# Patient Record
Sex: FEMALE | Race: WHITE | NOT HISPANIC OR LATINO | Employment: FULL TIME | URBAN - METROPOLITAN AREA
[De-identification: names, ages, dates, MRNs, and addresses within clinical notes are randomized per-mention and may not be internally consistent; named-entity substitution may affect disease eponyms.]

---
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
## 2020-02-06 ENCOUNTER — TELEPHONE (OUTPATIENT)
Dept: FAMILY MEDICINE CLINIC | Facility: CLINIC | Age: 20
End: 2020-02-06

## 2020-02-06 NOTE — TELEPHONE ENCOUNTER
----- Message from Anup Faye sent at 2/6/2020  9:13 AM EST -----  Regarding: schedule patient  Please call pt and schedule PE

## 2020-02-17 RX ORDER — IBUPROFEN 600 MG/1
1 TABLET ORAL 3 TIMES DAILY
COMMUNITY
Start: 2018-04-07

## 2020-02-20 ENCOUNTER — OFFICE VISIT (OUTPATIENT)
Dept: FAMILY MEDICINE CLINIC | Facility: CLINIC | Age: 20
End: 2020-02-20
Payer: COMMERCIAL

## 2020-02-20 VITALS
WEIGHT: 140.8 LBS | DIASTOLIC BLOOD PRESSURE: 80 MMHG | SYSTOLIC BLOOD PRESSURE: 118 MMHG | HEIGHT: 67 IN | RESPIRATION RATE: 16 BRPM | BODY MASS INDEX: 22.1 KG/M2 | OXYGEN SATURATION: 99 % | TEMPERATURE: 97.9 F | HEART RATE: 92 BPM

## 2020-02-20 DIAGNOSIS — Z00.121 ENCOUNTER FOR ROUTINE CHILD HEALTH EXAMINATION WITH ABNORMAL FINDINGS: Primary | ICD-10-CM

## 2020-02-20 DIAGNOSIS — F32.89 OTHER DEPRESSION: ICD-10-CM

## 2020-02-20 PROBLEM — F32.A DEPRESSION: Status: ACTIVE | Noted: 2020-02-20

## 2020-02-20 PROCEDURE — 3008F BODY MASS INDEX DOCD: CPT | Performed by: FAMILY MEDICINE

## 2020-02-20 PROCEDURE — 99395 PREV VISIT EST AGE 18-39: CPT | Performed by: FAMILY MEDICINE

## 2020-02-20 RX ORDER — FLUOXETINE 20 MG/1
20 TABLET, FILM COATED ORAL DAILY
Qty: 30 TABLET | Refills: 1 | Status: SHIPPED | OUTPATIENT
Start: 2020-02-20 | End: 2020-04-16

## 2020-02-20 NOTE — PROGRESS NOTES
Assessment/Plan:    Depression  Patient encouraged to go to therapy  Start fluoxetine (Prozac) 20 mg daily in the morning  Call or come back if any suicidal or homicidal thoughts  She stated that she has good support system between her parents and her friends  Encounter for routine child health examination with abnormal findings  It was discussed about immunizations, diet, exercise and safety measures  Diagnoses and all orders for this visit:    Encounter for routine child health examination with abnormal findings    Other depression  -     FLUoxetine (PROzac) 20 MG tablet; Take 1 tablet (20 mg total) by mouth daily    Other orders  -     ibuprofen (MOTRIN) 600 mg tablet; 1 tablet 3 (three) times a day            Subjective:      Patient ID: Carloyn Steiner is a 21 y o  female  Patient with no PMH presenting for physical  Her main concern today is feelings of being down, depressed, and hopeless  She also has lost interest in enjoyable/fun activities  These symptoms are affecting her sleep and causing her not to want to get up in the morning  She admits to feelings of guilt, lack-of-energy, trouble concentrating, and agitation  She denies changes in appetite or suicidal thoughts  Her only other concern at previous visits was a rash which has now resolved  The following portions of the patient's history were reviewed and updated as appropriate: allergies, current medications, past family history, past medical history, past social history, past surgical history and problem list     Review of Systems   Constitutional: Negative for chills and fever  HENT: Negative for hearing loss  Eyes: Negative for visual disturbance  Respiratory: Negative for cough and shortness of breath  Cardiovascular: Negative for chest pain and palpitations  Gastrointestinal: Negative for diarrhea and vomiting  Endocrine: Negative for cold intolerance and heat intolerance  Genitourinary: Negative for hematuria  Musculoskeletal: Negative for arthralgias and myalgias  Skin: Negative for color change and rash  Neurological: Negative for dizziness and headaches  Hematological: Does not bruise/bleed easily  Psychiatric/Behavioral: Positive for agitation, decreased concentration and dysphoric mood  Negative for behavioral problems, confusion, hallucinations, self-injury, sleep disturbance and suicidal ideas  The patient is nervous/anxious  The patient is not hyperactive  Objective:    /80 (BP Location: Left arm, Patient Position: Sitting, Cuff Size: Standard)   Pulse 92   Temp 97 9 °F (36 6 °C) (Tympanic)   Resp 16   Ht 5' 7" (1 702 m)   Wt 63 9 kg (140 lb 12 8 oz)   SpO2 99%   BMI 22 05 kg/m²      Physical Exam   Constitutional: She is oriented to person, place, and time  She appears well-developed and well-nourished  HENT:   Head: Normocephalic and atraumatic  Right Ear: External ear normal    Left Ear: External ear normal    Eyes: Pupils are equal, round, and reactive to light  EOM are normal    Neck: Normal range of motion  Neck supple  Cardiovascular: Normal rate, regular rhythm and normal heart sounds  No edema  Pulmonary/Chest: Effort normal and breath sounds normal    Abdominal: Soft  Bowel sounds are normal  She exhibits no distension  There is no tenderness  Musculoskeletal: Normal range of motion  She exhibits no edema  Lymphadenopathy:     She has no cervical adenopathy  Neurological: She is alert and oriented to person, place, and time  No cranial nerve deficit  Skin: Skin is warm and dry  No rash noted  No erythema  No pallor  Psychiatric: She has a normal mood and affect  Judgment and thought content normal    Patient seemed nervous  Nursing note and vitals reviewed  Depression Screening Follow-up Plan: Patient's depression screening was positive with a PHQ-2 score of 6  Their PHQ-9 score was 18  Patient assessed for underlying major depression   They have no active suicidal ideations  Brief counseling provided and recommend additional follow-up/re-evaluation next office visit

## 2020-02-20 NOTE — ASSESSMENT & PLAN NOTE
Patient encouraged to go to therapy  Start fluoxetine (Prozac) 20 mg daily in the morning  Call or come back if any suicidal or homicidal thoughts  She stated that she has good support system between her parents and her friends

## 2020-04-16 DIAGNOSIS — F32.89 OTHER DEPRESSION: ICD-10-CM

## 2020-04-16 RX ORDER — FLUOXETINE 20 MG/1
TABLET, FILM COATED ORAL
Qty: 30 TABLET | Refills: 1 | Status: SHIPPED | OUTPATIENT
Start: 2020-04-16 | End: 2020-07-06

## 2020-07-06 DIAGNOSIS — F32.89 OTHER DEPRESSION: ICD-10-CM

## 2020-07-06 RX ORDER — FLUOXETINE 20 MG/1
TABLET, FILM COATED ORAL
Qty: 30 TABLET | Refills: 1 | Status: SHIPPED | OUTPATIENT
Start: 2020-07-06 | End: 2020-09-10

## 2020-09-10 DIAGNOSIS — F32.89 OTHER DEPRESSION: ICD-10-CM

## 2020-09-10 RX ORDER — FLUOXETINE 20 MG/1
TABLET, FILM COATED ORAL
Qty: 30 TABLET | Refills: 1 | Status: SHIPPED | OUTPATIENT
Start: 2020-09-10 | End: 2020-10-16 | Stop reason: SDUPTHER

## 2020-10-16 ENCOUNTER — OFFICE VISIT (OUTPATIENT)
Dept: FAMILY MEDICINE CLINIC | Facility: CLINIC | Age: 20
End: 2020-10-16
Payer: COMMERCIAL

## 2020-10-16 VITALS
HEIGHT: 67 IN | WEIGHT: 174 LBS | OXYGEN SATURATION: 99 % | HEART RATE: 97 BPM | RESPIRATION RATE: 16 BRPM | SYSTOLIC BLOOD PRESSURE: 120 MMHG | DIASTOLIC BLOOD PRESSURE: 74 MMHG | BODY MASS INDEX: 27.31 KG/M2 | TEMPERATURE: 98.6 F

## 2020-10-16 DIAGNOSIS — F32.89 OTHER DEPRESSION: Primary | ICD-10-CM

## 2020-10-16 DIAGNOSIS — B07.8 OTHER VIRAL WARTS: ICD-10-CM

## 2020-10-16 DIAGNOSIS — E78.49 OTHER HYPERLIPIDEMIA: ICD-10-CM

## 2020-10-16 PROCEDURE — 99214 OFFICE O/P EST MOD 30 MIN: CPT | Performed by: FAMILY MEDICINE

## 2020-10-16 PROCEDURE — 1036F TOBACCO NON-USER: CPT | Performed by: FAMILY MEDICINE

## 2020-10-16 PROCEDURE — 17110 DESTRUCTION B9 LES UP TO 14: CPT | Performed by: FAMILY MEDICINE

## 2020-10-16 RX ORDER — FLUOXETINE 20 MG/1
20 TABLET, FILM COATED ORAL DAILY
Qty: 90 TABLET | Refills: 1 | Status: SHIPPED | OUTPATIENT
Start: 2020-10-16 | End: 2021-07-14

## 2020-10-26 ENCOUNTER — TELEPHONE (OUTPATIENT)
Dept: FAMILY MEDICINE CLINIC | Facility: CLINIC | Age: 20
End: 2020-10-26

## 2020-10-31 ENCOUNTER — LAB (OUTPATIENT)
Dept: LAB | Age: 20
End: 2020-10-31
Payer: COMMERCIAL

## 2020-10-31 DIAGNOSIS — E78.49 OTHER HYPERLIPIDEMIA: ICD-10-CM

## 2020-10-31 LAB
ALBUMIN SERPL BCP-MCNC: 3.9 G/DL (ref 3.5–5)
ALP SERPL-CCNC: 61 U/L (ref 46–116)
ALT SERPL W P-5'-P-CCNC: 20 U/L (ref 12–78)
ANION GAP SERPL CALCULATED.3IONS-SCNC: 1 MMOL/L (ref 4–13)
AST SERPL W P-5'-P-CCNC: 12 U/L (ref 5–45)
BILIRUB SERPL-MCNC: 0.62 MG/DL (ref 0.2–1)
BUN SERPL-MCNC: 9 MG/DL (ref 5–25)
CALCIUM SERPL-MCNC: 8.8 MG/DL (ref 8.3–10.1)
CHLORIDE SERPL-SCNC: 106 MMOL/L (ref 100–108)
CHOLEST SERPL-MCNC: 172 MG/DL (ref 50–200)
CO2 SERPL-SCNC: 30 MMOL/L (ref 21–32)
CREAT SERPL-MCNC: 0.67 MG/DL (ref 0.6–1.3)
GFR SERPL CREATININE-BSD FRML MDRD: 127 ML/MIN/1.73SQ M
GLUCOSE P FAST SERPL-MCNC: 85 MG/DL (ref 65–99)
HDLC SERPL-MCNC: 66 MG/DL
LDLC SERPL CALC-MCNC: 96 MG/DL (ref 0–100)
POTASSIUM SERPL-SCNC: 4.4 MMOL/L (ref 3.5–5.3)
PROT SERPL-MCNC: 7.5 G/DL (ref 6.4–8.2)
SODIUM SERPL-SCNC: 137 MMOL/L (ref 136–145)
TRIGL SERPL-MCNC: 48 MG/DL

## 2020-10-31 PROCEDURE — 36415 COLL VENOUS BLD VENIPUNCTURE: CPT

## 2020-10-31 PROCEDURE — 80061 LIPID PANEL: CPT

## 2020-10-31 PROCEDURE — 80053 COMPREHEN METABOLIC PANEL: CPT

## 2020-11-06 ENCOUNTER — TELEPHONE (OUTPATIENT)
Dept: FAMILY MEDICINE CLINIC | Facility: CLINIC | Age: 20
End: 2020-11-06

## 2020-12-31 DIAGNOSIS — L30.9 DERMATITIS: ICD-10-CM

## 2020-12-31 RX ORDER — CLOTRIMAZOLE AND BETAMETHASONE DIPROPIONATE 10; .64 MG/G; MG/G
CREAM TOPICAL
Qty: 30 G | Refills: 0 | Status: SHIPPED | OUTPATIENT
Start: 2020-12-31

## 2021-07-14 DIAGNOSIS — F32.89 OTHER DEPRESSION: ICD-10-CM

## 2021-07-14 RX ORDER — FLUOXETINE 20 MG/1
TABLET, FILM COATED ORAL
Qty: 30 TABLET | Refills: 0 | Status: SHIPPED | OUTPATIENT
Start: 2021-07-14 | End: 2021-08-08

## 2021-08-08 DIAGNOSIS — F32.89 OTHER DEPRESSION: ICD-10-CM

## 2021-08-08 RX ORDER — FLUOXETINE 20 MG/1
TABLET, FILM COATED ORAL
Qty: 30 TABLET | Refills: 0 | Status: SHIPPED | OUTPATIENT
Start: 2021-08-08 | End: 2021-09-03

## 2021-08-16 ENCOUNTER — OFFICE VISIT (OUTPATIENT)
Dept: URGENT CARE | Age: 21
End: 2021-08-16
Payer: COMMERCIAL

## 2021-08-16 VITALS
RESPIRATION RATE: 17 BRPM | HEIGHT: 66 IN | HEART RATE: 88 BPM | WEIGHT: 164 LBS | BODY MASS INDEX: 26.36 KG/M2 | SYSTOLIC BLOOD PRESSURE: 141 MMHG | OXYGEN SATURATION: 98 % | TEMPERATURE: 97.2 F | DIASTOLIC BLOOD PRESSURE: 74 MMHG

## 2021-08-16 DIAGNOSIS — S39.012A STRAIN OF MUSCLE, FASCIA AND TENDON OF LOWER BACK, INITIAL ENCOUNTER: ICD-10-CM

## 2021-08-16 DIAGNOSIS — M54.50 ACUTE MIDLINE LOW BACK PAIN WITHOUT SCIATICA: Primary | ICD-10-CM

## 2021-08-16 LAB
SL AMB  POCT GLUCOSE, UA: NEGATIVE
SL AMB LEUKOCYTE ESTERASE,UA: ABNORMAL
SL AMB POCT BILIRUBIN,UA: NEGATIVE
SL AMB POCT BLOOD,UA: NEGATIVE
SL AMB POCT CLARITY,UA: CLEAR
SL AMB POCT COLOR,UA: YELLOW
SL AMB POCT KETONES,UA: NEGATIVE
SL AMB POCT NITRITE,UA: NEGATIVE
SL AMB POCT PH,UA: 6.5
SL AMB POCT SPECIFIC GRAVITY,UA: 1.01
SL AMB POCT URINE PROTEIN: NEGATIVE
SL AMB POCT UROBILINOGEN: 0.2

## 2021-08-16 PROCEDURE — G0382 LEV 3 HOSP TYPE B ED VISIT: HCPCS | Performed by: NURSE PRACTITIONER

## 2021-08-16 PROCEDURE — 99283 EMERGENCY DEPT VISIT LOW MDM: CPT | Performed by: NURSE PRACTITIONER

## 2021-08-16 PROCEDURE — 81002 URINALYSIS NONAUTO W/O SCOPE: CPT | Performed by: NURSE PRACTITIONER

## 2021-08-16 RX ORDER — METHOCARBAMOL 500 MG/1
500 TABLET, FILM COATED ORAL 2 TIMES DAILY PRN
Qty: 20 TABLET | Refills: 0 | Status: SHIPPED | OUTPATIENT
Start: 2021-08-16

## 2021-08-16 NOTE — PATIENT INSTRUCTIONS
Follow up with pcp   Take meds as directed  Take muscle relaxer as RX, aware of side effects and how to take it  Do not drive or work with medications in your system  Take tylenol motrin or aleve to help relieve symptoms  Use ice and heat 15min each 3 times a day  Stretch  Symptoms worsen go to the ER  Lower Back Exercises   WHAT YOU NEED TO KNOW:   Lower back exercises help heal and strengthen your back muscles to prevent another injury  Ask your healthcare provider if you need to see a physical therapist for more advanced exercises  DISCHARGE INSTRUCTIONS:   Return to the emergency department if:   · You have severe pain that prevents you from moving  Contact your healthcare provider if:   · Your pain becomes worse  · You have new pain  · You have questions or concerns about your condition or care  Do lower back exercises safely:   · Do the exercises on a mat or firm surface  (not on a bed) to support your spine and prevent low back pain  · Move slowly and smoothly  Avoid fast or jerky motions  · Breathe normally  Do not hold your breath  · Stop if you feel pain  It is normal to feel some discomfort at first  Regular exercise will help decrease your discomfort over time  Lower back exercises: Your healthcare provider may recommend that you do back exercises 10 to 30 minutes each day  He may also recommend that you do exercises 1 to 3 times each day  Ask your healthcare provider which exercises are best for you and how often to do them  · Ankle pumps:  Lie on your back  Move your foot up (with your toes pointing toward your head)  Then, move your foot down (with your toes pointing away from you)  Repeat this exercise 10 times on each side  · Heel slides:  Lie on your back  Slowly bend one leg and then straighten it  Next, bend the other leg and then straighten it  Repeat 10 times on each side           · Pelvic tilt:  Lie on your back with your knees bent and feet flat on the floor  Place your arms in a relaxed position beside your body  Tighten the muscles of your abdomen and flatten your back against the floor  Hold for 5 seconds  Repeat 5 times  · Back stretch:  Lie on your back with your hands behind your head  Bend your knees and turn the lower half of your body to one side  Hold this position for 10 seconds  Repeat 3 times on each side  · Straight leg raises:  Lie on your back with one leg straight  Bend the other knee  Tighten your abdomen and then slowly lift the straight leg up about 6 to 12 inches off the floor  Hold for 1 to 5 seconds  Lower your leg slowly  Repeat 10 times on each leg  · Knee-to-chest:  Lie on your back with your knees bent and feet flat on the floor  Pull one of your knees toward your chest and hold it there for 5 seconds  Return your leg to the starting position  Lift the other knee toward your chest and hold for 5 seconds  Do this 5 times on each side  · Cat and camel:  Place your hands and knees on the floor  Arch your back upward toward the ceiling and lower your head  Round out your spine as much as you can  Hold for 5 seconds  Lift your head upward and push your chest downward toward the floor  Hold for 5 seconds  Do 3 sets or as directed  · Wall squats:  Stand with your back against a wall  Tighten the muscles of your abdomen  Slowly lower your body until your knees are bent at a 45 degree angle  Hold this position for 5 seconds  Slowly move back up to a standing position  Repeat 10 times  · Curl up:  Lie on your back with your knees bent and feet flat on the floor  Place your hands, palms down, underneath the curve in your lower back  Next, with your elbows on the floor, lift your shoulders and chest 2 to 3 inches  Keep your head in line with your shoulders  Hold this position for 5 seconds  When you can do this exercise without pain for 10 to 15 seconds, you may add a rotation   While your shoulders and chest are lifted off the ground, turn slightly to the left and hold  Repeat on the other side  · Bird dog:  Place your hands and knees on the floor  Keep your wrists directly below your shoulders and your knees directly below your hips  Pull your belly button in toward your spine  Do not flatten or arch your back  Tighten your abdominal muscles  Raise one arm straight out so that it is aligned with your head  Next, raise the leg opposite your arm  Hold this position for 15 seconds  Lower your arm and leg slowly and change sides  Do 5 sets  © Copyright [employer] 2021 Information is for End User's use only and may not be sold, redistributed or otherwise used for commercial purposes  All illustrations and images included in CareNotes® are the copyrighted property of A D A M [employer] Inc  or Babar Anand  The above information is an  only  It is not intended as medical advice for individual conditions or treatments  Talk to your doctor, nurse or pharmacist before following any medical regimen to see if it is safe and effective for you  Low Back Strain   WHAT YOU NEED TO KNOW:   Low back strain is an injury to your lower back muscles or tendons  Tendons are strong tissues that connect muscles to bones  The lower back supports most of your body weight and helps you move, twist, and bend  DISCHARGE INSTRUCTIONS:   Return to the emergency department if:   · You hear or feel a pop in your lower back  · You have increased swelling or pain in your lower back  · You have trouble moving your legs  · Your legs are numb  Contact your healthcare provider if:   · You have a fever  · Your pain does not go away, even after treatment  · You have questions or concerns about your condition or care  Medicines: The following medicines may be ordered by your healthcare provider:  · Acetaminophen decreases pain and fever  It is available without a doctor's order   Ask how much to take and how often to take it  Follow directions  Acetaminophen can cause liver damage if not taken correctly  · NSAIDs , such as ibuprofen, help decrease swelling, pain, and fever  This medicine is available with or without a doctor's order  NSAIDs can cause stomach bleeding or kidney problems in certain people  If you take blood thinner medicine, always ask your healthcare provider if NSAIDs are safe for you  Always read the medicine label and follow directions  · Muscle relaxers  help decrease pain and muscle spasms  · Prescription pain medicine  may be given  Ask how to take this medicine safely  · Take your medicine as directed  Contact your healthcare provider if you think your medicine is not helping or if you have side effects  Tell him or her if you are allergic to any medicine  Keep a list of the medicines, vitamins, and herbs you take  Include the amounts, and when and why you take them  Bring the list or the pill bottles to follow-up visits  Carry your medicine list with you in case of an emergency  Self-care:   · Rest  as directed  You may need to rest in bed for a period of time after your injury  Do not lift heavy objects  · Apply ice  on your back for 15 to 20 minutes every hour or as directed  Use an ice pack, or put crushed ice in a plastic bag  Cover it with a towel  Ice helps prevent tissue damage and decreases swelling and pain  · Apply heat  on your lower back for 20 to 30 minutes every 2 hours for as many days as directed  Heat helps decrease pain and muscle spasms  · Slowly start to increase your activity  as the pain decreases, or as directed  Prevent another low back strain:   · Use correct body movements  ? Bend at the hips and knees when you  objects  Do not bend from the waist  Use your leg muscles as you lift the load  Do not use your back  Keep the object close to your chest as you lift it   Try not to twist or lift anything above your waist     ? Change your position often when you stand for long periods of time  Rest one foot on a small box or footrest, and then switch to the other foot often  ? Try not to sit for long periods of time  When you do, sit in a straight-backed chair with your feet flat on the floor  ? Never reach, pull, or push while you are sitting  · Warm up before you exercise  Do exercises that strengthen your back muscles  Ask your healthcare provider about the best exercise plan for you  · Maintain a healthy weight  Ask your healthcare provider how much you should weigh  Ask him to help you create a weight loss plan if you are overweight  © Copyright [employer] 2021 Information is for End User's use only and may not be sold, redistributed or otherwise used for commercial purposes  All illustrations and images included in CareNotes® are the copyrighted property of [employer]  or Babar Anand  The above information is an  only  It is not intended as medical advice for individual conditions or treatments  Talk to your doctor, nurse or pharmacist before following any medical regimen to see if it is safe and effective for you

## 2021-08-16 NOTE — PROGRESS NOTES
NAME: Lisa Hay is a 24 y o  female  : 2000    MRN: [id number]    /74   Pulse 88   Temp (!) 97 2 °F (36 2 °C)   Resp 17   Ht 5' 5 5" (1 664 m)   Wt 74 4 kg (164 lb)   LMP  (LMP Unknown)   SpO2 98%   BMI 26 88 kg/m²     Assessment and Plan   Acute midline low back pain without sciatica [M54 5]  1  Acute midline low back pain without sciatica  POCT urine dip    methocarbamol (ROBAXIN) 500 mg tablet   2  Strain of muscle, fascia and tendon of lower back, initial encounter  methocarbamol (ROBAXIN) 500 mg tablet       Deysi was seen today for back pain  Diagnoses and all orders for this visit:    Acute midline low back pain without sciatica  -     POCT urine dip  -     methocarbamol (ROBAXIN) 500 mg tablet; Take 1 tablet (500 mg total) by mouth 2 (two) times a day as needed for muscle spasms    Strain of muscle, fascia and tendon of lower back, initial encounter  -     methocarbamol (ROBAXIN) 500 mg tablet; Take 1 tablet (500 mg total) by mouth 2 (two) times a day as needed for muscle spasms        Patient Instructions   Patient Instructions     Follow up with pcp   Take meds as directed  Take muscle relaxer as RX, aware of side effects and how to take it  Do not drive or work with medications in your system  Take tylenol motrin or aleve to help relieve symptoms  Use ice and heat 15min each 3 times a day  Stretch  Symptoms worsen go to the ER  Lower Back Exercises   WHAT YOU NEED TO KNOW:   Lower back exercises help heal and strengthen your back muscles to prevent another injury  Ask your healthcare provider if you need to see a physical therapist for more advanced exercises  DISCHARGE INSTRUCTIONS:   Return to the emergency department if:   · You have severe pain that prevents you from moving  Contact your healthcare provider if:   · Your pain becomes worse  · You have new pain  · You have questions or concerns about your condition or care      Do lower back exercises safely:   · Do the exercises on a mat or firm surface  (not on a bed) to support your spine and prevent low back pain  · Move slowly and smoothly  Avoid fast or jerky motions  · Breathe normally  Do not hold your breath  · Stop if you feel pain  It is normal to feel some discomfort at first  Regular exercise will help decrease your discomfort over time  Lower back exercises: Your healthcare provider may recommend that you do back exercises 10 to 30 minutes each day  He may also recommend that you do exercises 1 to 3 times each day  Ask your healthcare provider which exercises are best for you and how often to do them  · Ankle pumps:  Lie on your back  Move your foot up (with your toes pointing toward your head)  Then, move your foot down (with your toes pointing away from you)  Repeat this exercise 10 times on each side  · Heel slides:  Lie on your back  Slowly bend one leg and then straighten it  Next, bend the other leg and then straighten it  Repeat 10 times on each side  · Pelvic tilt:  Lie on your back with your knees bent and feet flat on the floor  Place your arms in a relaxed position beside your body  Tighten the muscles of your abdomen and flatten your back against the floor  Hold for 5 seconds  Repeat 5 times  · Back stretch:  Lie on your back with your hands behind your head  Bend your knees and turn the lower half of your body to one side  Hold this position for 10 seconds  Repeat 3 times on each side  · Straight leg raises:  Lie on your back with one leg straight  Bend the other knee  Tighten your abdomen and then slowly lift the straight leg up about 6 to 12 inches off the floor  Hold for 1 to 5 seconds  Lower your leg slowly  Repeat 10 times on each leg  · Knee-to-chest:  Lie on your back with your knees bent and feet flat on the floor  Pull one of your knees toward your chest and hold it there for 5 seconds   Return your leg to the starting position  Lift the other knee toward your chest and hold for 5 seconds  Do this 5 times on each side  · Cat and camel:  Place your hands and knees on the floor  Arch your back upward toward the ceiling and lower your head  Round out your spine as much as you can  Hold for 5 seconds  Lift your head upward and push your chest downward toward the floor  Hold for 5 seconds  Do 3 sets or as directed  · Wall squats:  Stand with your back against a wall  Tighten the muscles of your abdomen  Slowly lower your body until your knees are bent at a 45 degree angle  Hold this position for 5 seconds  Slowly move back up to a standing position  Repeat 10 times  · Curl up:  Lie on your back with your knees bent and feet flat on the floor  Place your hands, palms down, underneath the curve in your lower back  Next, with your elbows on the floor, lift your shoulders and chest 2 to 3 inches  Keep your head in line with your shoulders  Hold this position for 5 seconds  When you can do this exercise without pain for 10 to 15 seconds, you may add a rotation  While your shoulders and chest are lifted off the ground, turn slightly to the left and hold  Repeat on the other side  · Bird dog:  Place your hands and knees on the floor  Keep your wrists directly below your shoulders and your knees directly below your hips  Pull your belly button in toward your spine  Do not flatten or arch your back  Tighten your abdominal muscles  Raise one arm straight out so that it is aligned with your head  Next, raise the leg opposite your arm  Hold this position for 15 seconds  Lower your arm and leg slowly and change sides  Do 5 sets  © Copyright [employer] 2021 Information is for End User's use only and may not be sold, redistributed or otherwise used for commercial purposes   All illustrations and images included in CareNotes® are the copyrighted property of [employer] Inc  or Babar Anand  The above information is an  only  It is not intended as medical advice for individual conditions or treatments  Talk to your doctor, nurse or pharmacist before following any medical regimen to see if it is safe and effective for you  Low Back Strain   WHAT YOU NEED TO KNOW:   Low back strain is an injury to your lower back muscles or tendons  Tendons are strong tissues that connect muscles to bones  The lower back supports most of your body weight and helps you move, twist, and bend  DISCHARGE INSTRUCTIONS:   Return to the emergency department if:   · You hear or feel a pop in your lower back  · You have increased swelling or pain in your lower back  · You have trouble moving your legs  · Your legs are numb  Contact your healthcare provider if:   · You have a fever  · Your pain does not go away, even after treatment  · You have questions or concerns about your condition or care  Medicines: The following medicines may be ordered by your healthcare provider:  · Acetaminophen decreases pain and fever  It is available without a doctor's order  Ask how much to take and how often to take it  Follow directions  Acetaminophen can cause liver damage if not taken correctly  · NSAIDs , such as ibuprofen, help decrease swelling, pain, and fever  This medicine is available with or without a doctor's order  NSAIDs can cause stomach bleeding or kidney problems in certain people  If you take blood thinner medicine, always ask your healthcare provider if NSAIDs are safe for you  Always read the medicine label and follow directions  · Muscle relaxers  help decrease pain and muscle spasms  · Prescription pain medicine  may be given  Ask how to take this medicine safely  · Take your medicine as directed  Contact your healthcare provider if you think your medicine is not helping or if you have side effects  Tell him or her if you are allergic to any medicine   Keep a list of the medicines, vitamins, and herbs you take  Include the amounts, and when and why you take them  Bring the list or the pill bottles to follow-up visits  Carry your medicine list with you in case of an emergency  Self-care:   · Rest  as directed  You may need to rest in bed for a period of time after your injury  Do not lift heavy objects  · Apply ice  on your back for 15 to 20 minutes every hour or as directed  Use an ice pack, or put crushed ice in a plastic bag  Cover it with a towel  Ice helps prevent tissue damage and decreases swelling and pain  · Apply heat  on your lower back for 20 to 30 minutes every 2 hours for as many days as directed  Heat helps decrease pain and muscle spasms  · Slowly start to increase your activity  as the pain decreases, or as directed  Prevent another low back strain:   · Use correct body movements  ? Bend at the hips and knees when you  objects  Do not bend from the waist  Use your leg muscles as you lift the load  Do not use your back  Keep the object close to your chest as you lift it  Try not to twist or lift anything above your waist     ? Change your position often when you stand for long periods of time  Rest one foot on a small box or footrest, and then switch to the other foot often  ? Try not to sit for long periods of time  When you do, sit in a straight-backed chair with your feet flat on the floor  ? Never reach, pull, or push while you are sitting  · Warm up before you exercise  Do exercises that strengthen your back muscles  Ask your healthcare provider about the best exercise plan for you  · Maintain a healthy weight  Ask your healthcare provider how much you should weigh  Ask him to help you create a weight loss plan if you are overweight  © Copyright [employer] 2021 Information is for End User's use only and may not be sold, redistributed or otherwise used for commercial purposes   All illustrations and images included in [facility] are the copyrighted property of [employer] M [employer] Inc  or Babar Carlos [facility]  The above information is an  only  It is not intended as medical advice for individual conditions or treatments  Talk to your doctor, nurse or pharmacist before following any medical regimen to see if it is safe and effective for you  Proceed to the nearest ER if symptoms worsen, Follow up with your PCP  Continue to social distance, wash your hands, and wear your masks  Please continue to follow the CDC  gov guidelines daily for they are subject to change on COVID-19    Chief Complaint     Chief Complaint   Patient presents with    Back Pain     started Thurs mid low back like pins/needles  Worse w/ ambulation, nonradiating  Heavier lifting Weds         History of Present Illness     23 yo female here today with low back pain for the past 4 days, she did an office move at that time but doesn't recall injuring herself  She has been taking tylenol and icy hot to the area with little relief, no issues having a BM or with urinating  She has no pain or burning with urination and no chance or pregnancy  Review of Systems   Review of Systems   Constitutional: Negative  HENT: Negative  Respiratory: Negative  Cardiovascular: Negative  Gastrointestinal: Negative  Musculoskeletal: Positive for back pain (low back pain)  Negative for gait problem  Skin: Negative  Neurological: Negative            Current Medications       Current Outpatient Medications:     FLUoxetine (PROzac) 20 MG tablet, TAKE 1 TABLET BY MOUTH EVERY DAY, Disp: 30 tablet, Rfl: 0    ibuprofen (MOTRIN) 600 mg tablet, 1 tablet 3 (three) times a day, Disp: , Rfl:     clotrimazole-betamethasone (LOTRISONE) 1-0 05 % cream, APPLY TO AFFECTED AREA TWICE A DAY (Patient not taking: Reported on 8/16/2021), Disp: 30 g, Rfl: 0    methocarbamol (ROBAXIN) 500 mg tablet, Take 1 tablet (500 mg total) by mouth 2 (two) times a day as needed for muscle spasms, Disp: 20 tablet, Rfl: 0    norethindrone (MICRONOR) 0 35 MG tablet, Take 0 35 mg by mouth daily, Disp: , Rfl:     Current Allergies     Allergies as of 08/16/2021 - Reviewed 08/16/2021   Allergen Reaction Noted    Sulfisoxazole  05/22/2018              Past Medical History:   Diagnosis Date    History of placement of ear tubes        Past Surgical History:   Procedure Laterality Date    TYMPANOSTOMY TUBE PLACEMENT         Family History   Problem Relation Age of Onset    Hypertension Maternal Grandmother     COPD Maternal Grandmother     Breast cancer Maternal Grandfather          Medications have been verified  The following portions of the patient's history were reviewed and updated as appropriate: allergies, current medications, past family history, past medical history, past social history, past surgical history and problem list     Objective   /74   Pulse 88   Temp (!) 97 2 °F (36 2 °C)   Resp 17   Ht 5' 5 5" (1 664 m)   Wt 74 4 kg (164 lb)   LMP  (LMP Unknown)   SpO2 98%   BMI 26 88 kg/m²      Physical Exam     Physical Exam  Constitutional:       Appearance: Normal appearance  Cardiovascular:      Rate and Rhythm: Normal rate and regular rhythm  Pulses: Normal pulses  Heart sounds: Normal heart sounds  Pulmonary:      Effort: Pulmonary effort is normal       Breath sounds: Normal breath sounds  Musculoskeletal:         General: Tenderness present  No swelling, deformity or signs of injury  Cervical back: Normal       Thoracic back: Normal       Lumbar back: Spasms and tenderness present  No swelling, edema, deformity, signs of trauma or lacerations  Decreased range of motion  Negative right straight leg raise test  No scoliosis  Right lower leg: No edema  Left lower leg: No edema  Comments: Pain with flexing forward, offers no complaints, no bruising, no abrasions, limited ROM   Skin:     General: Skin is warm        Capillary Refill: Capillary refill takes less than 2 seconds  Neurological:      Mental Status: She is alert  Note: Portions of this record may have been created with voice recognition software  Occasional wrong word or "sound a like" substitutions may have occurred due to the inherent limitations of voice recognition software  Please read the chart carefully and recognize, using context, where substitutions have occurred  JAVIER Sharma

## 2021-09-01 DIAGNOSIS — F32.89 OTHER DEPRESSION: ICD-10-CM

## 2021-09-01 NOTE — TELEPHONE ENCOUNTER
[facility] sent request to refill prozac  I sent message on mychart to pt stating she is due for appt for refill     Pt was last seen 10/16/20

## 2021-09-03 RX ORDER — FLUOXETINE 20 MG/1
TABLET, FILM COATED ORAL
Qty: 7 TABLET | Refills: 0 | Status: SHIPPED | OUTPATIENT
Start: 2021-09-03 | End: 2021-10-01 | Stop reason: SDUPTHER

## 2021-10-01 DIAGNOSIS — F32.89 OTHER DEPRESSION: ICD-10-CM

## 2021-10-01 RX ORDER — FLUOXETINE 20 MG/1
20 TABLET, FILM COATED ORAL DAILY
Qty: 30 TABLET | Refills: 0 | Status: SHIPPED | OUTPATIENT
Start: 2021-10-01 | End: 2021-10-07 | Stop reason: SDUPTHER

## 2021-10-07 ENCOUNTER — OFFICE VISIT (OUTPATIENT)
Dept: FAMILY MEDICINE CLINIC | Facility: CLINIC | Age: 21
End: 2021-10-07
Payer: COMMERCIAL

## 2021-10-07 VITALS
BODY MASS INDEX: 33.35 KG/M2 | HEART RATE: 84 BPM | SYSTOLIC BLOOD PRESSURE: 122 MMHG | RESPIRATION RATE: 16 BRPM | HEIGHT: 66 IN | TEMPERATURE: 97.7 F | WEIGHT: 207.5 LBS | OXYGEN SATURATION: 99 % | DIASTOLIC BLOOD PRESSURE: 78 MMHG

## 2021-10-07 DIAGNOSIS — F32.89 OTHER DEPRESSION: ICD-10-CM

## 2021-10-07 DIAGNOSIS — Z00.00 HEALTHCARE MAINTENANCE: Primary | ICD-10-CM

## 2021-10-07 DIAGNOSIS — F84.5 ASPERGER'S DISORDER: ICD-10-CM

## 2021-10-07 PROCEDURE — 1036F TOBACCO NON-USER: CPT | Performed by: FAMILY MEDICINE

## 2021-10-07 PROCEDURE — 99395 PREV VISIT EST AGE 18-39: CPT | Performed by: FAMILY MEDICINE

## 2021-10-07 PROCEDURE — 3008F BODY MASS INDEX DOCD: CPT | Performed by: FAMILY MEDICINE

## 2021-10-07 RX ORDER — FLUOXETINE 20 MG/1
20 TABLET, FILM COATED ORAL DAILY
Qty: 30 TABLET | Refills: 3 | Status: SHIPPED | OUTPATIENT
Start: 2021-10-07 | End: 2021-12-07 | Stop reason: SDUPTHER

## 2021-10-14 ENCOUNTER — OFFICE VISIT (OUTPATIENT)
Dept: URGENT CARE | Age: 21
End: 2021-10-14

## 2021-10-14 VITALS
BODY MASS INDEX: 32.14 KG/M2 | HEART RATE: 80 BPM | TEMPERATURE: 98 F | WEIGHT: 200 LBS | OXYGEN SATURATION: 100 % | HEIGHT: 66 IN | DIASTOLIC BLOOD PRESSURE: 60 MMHG | SYSTOLIC BLOOD PRESSURE: 130 MMHG | RESPIRATION RATE: 18 BRPM

## 2021-10-14 DIAGNOSIS — R39.9 UTI SYMPTOMS: ICD-10-CM

## 2021-10-14 DIAGNOSIS — R30.0 DYSURIA: Primary | ICD-10-CM

## 2021-10-14 LAB
SL AMB  POCT GLUCOSE, UA: ABNORMAL
SL AMB LEUKOCYTE ESTERASE,UA: ABNORMAL
SL AMB POCT BILIRUBIN,UA: ABNORMAL
SL AMB POCT BLOOD,UA: ABNORMAL
SL AMB POCT CLARITY,UA: ABNORMAL
SL AMB POCT COLOR,UA: YELLOW
SL AMB POCT KETONES,UA: ABNORMAL
SL AMB POCT NITRITE,UA: ABNORMAL
SL AMB POCT PH,UA: 8
SL AMB POCT SPECIFIC GRAVITY,UA: 1.01
SL AMB POCT URINE PROTEIN: ABNORMAL
SL AMB POCT UROBILINOGEN: 0.2

## 2021-10-14 PROCEDURE — 87147 CULTURE TYPE IMMUNOLOGIC: CPT | Performed by: NURSE PRACTITIONER

## 2021-10-14 PROCEDURE — 81002 URINALYSIS NONAUTO W/O SCOPE: CPT | Performed by: NURSE PRACTITIONER

## 2021-10-14 PROCEDURE — G0382 LEV 3 HOSP TYPE B ED VISIT: HCPCS | Performed by: NURSE PRACTITIONER

## 2021-10-14 PROCEDURE — 87086 URINE CULTURE/COLONY COUNT: CPT | Performed by: NURSE PRACTITIONER

## 2021-10-14 RX ORDER — PHENAZOPYRIDINE HYDROCHLORIDE 200 MG/1
200 TABLET, FILM COATED ORAL
Qty: 12 TABLET | Refills: 0 | Status: SHIPPED | OUTPATIENT
Start: 2021-10-14

## 2021-10-14 RX ORDER — NITROFURANTOIN 25; 75 MG/1; MG/1
100 CAPSULE ORAL 2 TIMES DAILY
Qty: 14 CAPSULE | Refills: 0 | Status: SHIPPED | OUTPATIENT
Start: 2021-10-14

## 2021-10-15 LAB — BACTERIA UR CULT: ABNORMAL

## 2021-10-18 ENCOUNTER — TELEPHONE (OUTPATIENT)
Dept: URGENT CARE | Age: 21
End: 2021-10-18

## 2021-12-07 ENCOUNTER — TELEPHONE (OUTPATIENT)
Dept: FAMILY MEDICINE CLINIC | Facility: CLINIC | Age: 21
End: 2021-12-07

## 2021-12-07 DIAGNOSIS — F32.89 OTHER DEPRESSION: ICD-10-CM

## 2021-12-07 RX ORDER — FLUOXETINE 20 MG/1
20 TABLET, FILM COATED ORAL DAILY
Qty: 90 TABLET | Refills: 0 | Status: SHIPPED | OUTPATIENT
Start: 2021-12-07 | End: 2022-05-02

## 2022-04-19 ENCOUNTER — OFFICE VISIT (OUTPATIENT)
Dept: URGENT CARE | Age: 22
End: 2022-04-19
Payer: COMMERCIAL

## 2022-04-19 VITALS
HEIGHT: 66 IN | OXYGEN SATURATION: 99 % | TEMPERATURE: 97.8 F | RESPIRATION RATE: 18 BRPM | WEIGHT: 205 LBS | HEART RATE: 104 BPM | BODY MASS INDEX: 32.95 KG/M2

## 2022-04-19 DIAGNOSIS — J06.9 ACUTE URI: Primary | ICD-10-CM

## 2022-04-19 DIAGNOSIS — R05.9 COUGH: ICD-10-CM

## 2022-04-19 PROCEDURE — 87636 SARSCOV2 & INF A&B AMP PRB: CPT

## 2022-04-19 PROCEDURE — G0382 LEV 3 HOSP TYPE B ED VISIT: HCPCS

## 2022-04-19 RX ORDER — BENZONATATE 200 MG/1
200 CAPSULE ORAL 3 TIMES DAILY PRN
Qty: 20 CAPSULE | Refills: 0 | Status: SHIPPED | OUTPATIENT
Start: 2022-04-19 | End: 2022-05-31 | Stop reason: ALTCHOICE

## 2022-04-19 NOTE — PROGRESS NOTES
[facility]        NAME: Elfrieda Baumgarten is a 25 y o  female  : 2000    MRN: [id number]  DATE: 2022  TIME: 11:27 AM    Assessment and Plan   Acute URI [J06 9]  1  Acute URI     2  Cough  Covid/Flu-Office Collect    benzonatate (TESSALON) 200 MG capsule     Patient has complaints of URI symptoms including cough, sore throat, muscle aches, congestion, and post nasal drip  Works in a nursing home  Has not tried any OTC medication  At this time will order Tessalon for cough and send for COVID/Flu    Patient Instructions     Take Tessalon as directed  May use OTC medication for congestion  Follow up with PCP as needed    Chief Complaint     Chief Complaint   Patient presents with    Cold Like Symptoms     Pt c/o dry cough,muscle ache, fatigue, H/A, sore throat & runny nose started Sat  Also chest sore from cough Tried Claritin/Flonase +Vacc         History of Present Illness       Patient states has been having symptoms of cough, congestion, HA, sore throat, muscle aches and fatigue since Friday night  Has been taking Claritin with minimal relief  Has not tried anything OTC  She thought it was allergies  Denies fevers  COVID and flu vaccinated  Has not had any sick contacts  Cough  This is a new problem  The current episode started in the past 7 days  The problem has been waxing and waning  The problem occurs hourly  The cough is non-productive  Associated symptoms include headaches, myalgias, nasal congestion, postnasal drip, rhinorrhea and a sore throat  Pertinent negatives include no chest pain, ear congestion, ear pain, fever or shortness of breath  The symptoms are aggravated by pollens and exercise  She has tried nothing for the symptoms  Her past medical history is significant for environmental allergies  There is no history of asthma, bronchitis or pneumonia  Review of Systems   Review of Systems   Constitutional: Negative for fever     HENT: Positive for postnasal drip, rhinorrhea and sore throat  Negative for ear pain  Respiratory: Positive for cough  Negative for shortness of breath  Cardiovascular: Negative for chest pain and palpitations  Musculoskeletal: Positive for myalgias  Allergic/Immunologic: Positive for environmental allergies  Neurological: Positive for headaches           Current Medications       Current Outpatient Medications:     FLUoxetine (PROzac) 20 MG tablet, Take 1 tablet (20 mg total) by mouth daily, Disp: 90 tablet, Rfl: 0    ibuprofen (MOTRIN) 600 mg tablet, 1 tablet 3 (three) times a day, Disp: , Rfl:     benzonatate (TESSALON) 200 MG capsule, Take 1 capsule (200 mg total) by mouth 3 (three) times a day as needed for cough, Disp: 20 capsule, Rfl: 0    clotrimazole-betamethasone (LOTRISONE) 1-0 05 % cream, APPLY TO AFFECTED AREA TWICE A DAY (Patient not taking: Reported on 8/16/2021), Disp: 30 g, Rfl: 0    methocarbamol (ROBAXIN) 500 mg tablet, Take 1 tablet (500 mg total) by mouth 2 (two) times a day as needed for muscle spasms (Patient not taking: Reported on 10/14/2021), Disp: 20 tablet, Rfl: 0    nitrofurantoin (MACROBID) 100 mg capsule, Take 1 capsule (100 mg total) by mouth 2 (two) times a day (Patient not taking: Reported on 4/19/2022 ), Disp: 14 capsule, Rfl: 0    norethindrone (MICRONOR) 0 35 MG tablet, Take 0 35 mg by mouth daily, Disp: , Rfl:     phenazopyridine (PYRIDIUM) 200 mg tablet, Take 1 tablet (200 mg total) by mouth 3 (three) times a day with meals (Patient not taking: Reported on 4/19/2022 ), Disp: 12 tablet, Rfl: 0    Current Allergies     Allergies as of 04/19/2022 - Reviewed 04/19/2022   Allergen Reaction Noted    Sulfisoxazole  05/22/2018            The following portions of the patient's history were reviewed and updated as appropriate: allergies, current medications, past family history, past medical history, past social history, past surgical history and problem list      Past Medical History:   Diagnosis Date    History of placement of ear tubes        Past Surgical History:   Procedure Laterality Date    TYMPANOSTOMY TUBE PLACEMENT         Family History   Problem Relation Age of Onset    Hypertension Maternal Grandmother     COPD Maternal Grandmother     Breast cancer Maternal Grandfather          Medications have been verified  Objective   Pulse 104   Temp 97 8 °F (36 6 °C)   Resp 18   Ht 5' 6" (1 676 m)   Wt 93 kg (205 lb)   LMP  (LMP Unknown)   SpO2 99%   BMI 33 09 kg/m²   No LMP recorded (lmp unknown)  (Menstrual status: Birth Control)  Physical Exam     Physical Exam  Vitals reviewed  Constitutional:       General: She is not in acute distress  Appearance: Normal appearance  She is normal weight  She is not ill-appearing  HENT:      Head: Normocephalic and atraumatic  Right Ear: Tympanic membrane and ear canal normal  No middle ear effusion  Left Ear: Tympanic membrane and ear canal normal   No middle ear effusion  Nose: Congestion and rhinorrhea present  Mouth/Throat:      Mouth: Mucous membranes are moist       Pharynx: Posterior oropharyngeal erythema present  No oropharyngeal exudate  Tonsils: No tonsillar exudate  Eyes:      Extraocular Movements: Extraocular movements intact  Conjunctiva/sclera: Conjunctivae normal       Pupils: Pupils are equal, round, and reactive to light  Cardiovascular:      Rate and Rhythm: Normal rate and regular rhythm  Pulses: Normal pulses  Heart sounds: Normal heart sounds  No murmur heard  Pulmonary:      Effort: Pulmonary effort is normal  No respiratory distress  Breath sounds: Normal breath sounds  No wheezing  Abdominal:      General: Bowel sounds are normal  There is no distension  Palpations: Abdomen is soft  Tenderness: There is no abdominal tenderness  There is no guarding  Musculoskeletal:      Cervical back: Normal range of motion and neck supple  No tenderness  Lymphadenopathy:      Cervical: No cervical adenopathy  Skin:     General: Skin is warm  Neurological:      General: No focal deficit present  Mental Status: She is alert     Psychiatric:         Mood and Affect: Mood normal          Behavior: Behavior normal          Judgment: Judgment normal

## 2022-04-19 NOTE — LETTER
[facility] [address]  [address] [address]  Dept: [phone]    April 19, 2022    Patient: Rayvon Klinefelter Doutt  YOB: 2000    Rayvon Klinefelter Doutt was seen and evaluated at our [facility]  Please note if Covid and Flu tests are negative, they may return to work when fever free for 24 hours without the use of a fever reducing agent  If Covid or Flu test is positive, they may return to work on 4/22/2022 , as this is 5 days from the onset of symptoms  Upon return, they must then adhere to strict masking for an additional 5 days      Sincerely,    JAVIER Miranda

## 2022-04-19 NOTE — PATIENT INSTRUCTIONS
Cold Symptoms   AMBULATORY CARE:   Cold symptoms  include sneezing, dry throat, a stuffy nose, headache, watery eyes, and a cough  Your cough may be dry, or you may cough up mucus  You may also have muscle aches, joint pain, and tiredness  Rarely, you may have a fever  Cold symptoms occur from inflammation in your upper respiratory system caused by a virus  Most colds go away without treatment  Seek care immediately if:   · You have increased tiredness and weakness  · You are unable to eat  · Your heart is beating much faster than usual for you  · You see white spots in the back of your throat and your neck is swollen and sore to the touch  · You see pinpoint or larger reddish-purple dots on your skin  Contact your healthcare provider if:   · You have a fever higher than 102°F (38 9°C)  · You have new or worsening shortness of breath  · You have thick nasal drainage for more than 2 days  · Your symptoms do not improve or get worse within 5 days  · You have questions or concerns about your condition or care  Treatment for cold symptoms  may include NSAIDS to decrease muscle aches and fever  Cold medicines may also be given to decrease coughing, nasal stuffiness, sneezing, and a runny nose  Manage your cold symptoms: The following may help relieve cold symptoms, such as a dry throat and congestion:  · Gargle with mouthwash or warm salt water as directed  · Suck on throat lozenges or hard candy  · Use a cold or warm vaporizer or humidifier to ease your breathing  · Rest for at least 2 days and then as needed to decrease tiredness and weakness  · Use petroleum based jelly around your nostrils to decrease irritation from blowing your nose  · Drink plenty of liquids  Liquids will help thin and loosen thick mucus so you can cough it up  Liquids will also keep you hydrated   Ask your healthcare provider which liquids are best for you and how much to drink each day     Prevent the spread of germs  by washing your hands often  You can spread your cold germs to others for at least 3 days after your symptoms start  Do not share items, such as eating utensils  Cover your nose and mouth when you cough or sneeze using the crook of your elbow instead of your hands  Throw used tissues in the garbage  Do not smoke:  Smoking may worsen your symptoms and increase the length of time you feel sick  Talk with your healthcare provider if you need help to stop smoking  Follow up with your doctor as directed:  Write down your questions so you remember to ask them during your visits  © Copyright [employer] 2022 Information is for End User's use only and may not be sold, redistributed or otherwise used for commercial purposes  All illustrations and images included in CareNotes® are the copyrighted property of [employer]  or Babar Anand  The above information is an  only  It is not intended as medical advice for individual conditions or treatments  Talk to your doctor, nurse or pharmacist before following any medical regimen to see if it is safe and effective for you

## 2022-04-20 LAB
FLUAV RNA RESP QL NAA+PROBE: NEGATIVE
FLUBV RNA RESP QL NAA+PROBE: NEGATIVE
SARS-COV-2 RNA RESP QL NAA+PROBE: NEGATIVE

## 2022-04-29 DIAGNOSIS — F32.89 OTHER DEPRESSION: ICD-10-CM

## 2022-05-02 RX ORDER — FLUOXETINE 20 MG/1
TABLET, FILM COATED ORAL
Qty: 30 TABLET | Refills: 0 | Status: SHIPPED | OUTPATIENT
Start: 2022-05-02 | End: 2022-05-31 | Stop reason: SDUPTHER

## 2022-05-31 ENCOUNTER — OFFICE VISIT (OUTPATIENT)
Dept: FAMILY MEDICINE CLINIC | Facility: CLINIC | Age: 22
End: 2022-05-31
Payer: COMMERCIAL

## 2022-05-31 VITALS
WEIGHT: 212.5 LBS | TEMPERATURE: 98.1 F | BODY MASS INDEX: 34.15 KG/M2 | HEART RATE: 83 BPM | SYSTOLIC BLOOD PRESSURE: 110 MMHG | RESPIRATION RATE: 16 BRPM | OXYGEN SATURATION: 98 % | HEIGHT: 66 IN | DIASTOLIC BLOOD PRESSURE: 70 MMHG

## 2022-05-31 DIAGNOSIS — F84.5 ASPERGER'S DISORDER: ICD-10-CM

## 2022-05-31 DIAGNOSIS — E66.09 CLASS 1 OBESITY DUE TO EXCESS CALORIES WITHOUT SERIOUS COMORBIDITY WITH BODY MASS INDEX (BMI) OF 34.0 TO 34.9 IN ADULT: ICD-10-CM

## 2022-05-31 DIAGNOSIS — F32.89 OTHER DEPRESSION: Primary | ICD-10-CM

## 2022-05-31 DIAGNOSIS — E78.49 OTHER HYPERLIPIDEMIA: ICD-10-CM

## 2022-05-31 PROBLEM — E66.811 CLASS 1 OBESITY DUE TO EXCESS CALORIES WITHOUT SERIOUS COMORBIDITY WITH BODY MASS INDEX (BMI) OF 34.0 TO 34.9 IN ADULT: Status: ACTIVE | Noted: 2022-05-31

## 2022-05-31 PROCEDURE — 99214 OFFICE O/P EST MOD 30 MIN: CPT | Performed by: FAMILY MEDICINE

## 2022-05-31 RX ORDER — PHENTERMINE HYDROCHLORIDE 37.5 MG/1
37.5 CAPSULE ORAL EVERY MORNING
Qty: 30 CAPSULE | Refills: 0 | Status: SHIPPED | OUTPATIENT
Start: 2022-05-31 | End: 2022-06-02 | Stop reason: SDUPTHER

## 2022-05-31 RX ORDER — FLUOXETINE 10 MG/1
10 TABLET, FILM COATED ORAL DAILY
Qty: 30 TABLET | Refills: 1 | Status: SHIPPED | OUTPATIENT
Start: 2022-05-31

## 2022-05-31 NOTE — PROGRESS NOTES
Assessment/Plan:  Depression  Stable  Patient desires to stop the medication  Was discussed with her about tapering of the medication by taking 20 mg alternating with 10 mg every other day  Then moved to 10 mg daily for 1 week and then alternate every other day 10 mg for 1-2 weeks and then stop  Come back in 1 month for follow-up  Other hyperlipidemia  Discussed about low-fat diet and regular exercise  Come back in 1 month for follow-up  Asperger's disorder  Stable  Class 1 obesity due to excess calories without serious comorbidity with body mass index (BMI) of 34 0 to 34 9 in adult  It was discussed about low carb diet and regular exercise  Discussed about treatment options  She was given prescription for phentermine  Discussed about possible side effects  Come back in 1 month for follow-up  Diagnoses and all orders for this visit:    Other depression  -     FLUoxetine (PROzac) 10 MG tablet; Take 1 tablet (10 mg total) by mouth daily    Asperger's disorder    Class 1 obesity due to excess calories without serious comorbidity with body mass index (BMI) of 34 0 to 34 9 in adult  -     phentermine 37 5 MG capsule; Take 1 capsule (37 5 mg total) by mouth every morning    Other hyperlipidemia        There are no Patient Instructions on file for this visit  Return in about 1 month (around 6/30/2022)  Subjective:      Patient ID: Peg Navarrete is a 25 y o  female  Chief Complaint   Patient presents with    Depression     Follow up        She is here today for follow-up for depression  She stated she has been feeling well and thinking about tapering off the Prozac  Denies feeling depressed  She has been having hard time losing weight  Depression  Pertinent negatives include no abdominal pain, chest pain, chills, coughing, fever, headaches or rash         The following portions of the patient's history were reviewed and updated as appropriate: allergies, current medications, past family history, past medical history, past social history, past surgical history and problem list     Review of Systems   Constitutional: Negative for chills and fever  HENT: Negative for trouble swallowing  Eyes: Negative for visual disturbance  Respiratory: Negative for cough and shortness of breath  Cardiovascular: Negative for chest pain, palpitations and leg swelling  Gastrointestinal: Negative for abdominal pain, constipation and diarrhea  Endocrine: Negative for cold intolerance and heat intolerance  Genitourinary: Negative for difficulty urinating and dysuria  Musculoskeletal: Negative for gait problem  Skin: Negative for rash  Neurological: Negative for dizziness, tremors, seizures and headaches  Hematological: Negative for adenopathy  Psychiatric/Behavioral: Positive for depression  Negative for behavioral problems           Current Outpatient Medications   Medication Sig Dispense Refill    FLUoxetine (PROzac) 10 MG tablet Take 1 tablet (10 mg total) by mouth daily 30 tablet 1    ibuprofen (MOTRIN) 600 mg tablet 1 tablet 3 (three) times a day      norethindrone (MICRONOR) 0 35 MG tablet Take 0 35 mg by mouth daily      phentermine 37 5 MG capsule Take 1 capsule (37 5 mg total) by mouth every morning 30 capsule 0    clotrimazole-betamethasone (LOTRISONE) 1-0 05 % cream APPLY TO AFFECTED AREA TWICE A DAY (Patient not taking: No sig reported) 30 g 0    methocarbamol (ROBAXIN) 500 mg tablet Take 1 tablet (500 mg total) by mouth 2 (two) times a day as needed for muscle spasms (Patient not taking: No sig reported) 20 tablet 0    nitrofurantoin (MACROBID) 100 mg capsule Take 1 capsule (100 mg total) by mouth 2 (two) times a day (Patient not taking: No sig reported) 14 capsule 0    phenazopyridine (PYRIDIUM) 200 mg tablet Take 1 tablet (200 mg total) by mouth 3 (three) times a day with meals (Patient not taking: No sig reported) 12 tablet 0     No current facility-administered medications for this visit  Objective:    /70 (BP Location: Left arm, Patient Position: Sitting, Cuff Size: Large)   Pulse 83   Temp 98 1 °F (36 7 °C) (Tympanic)   Resp 16   Ht 5' 6" (1 676 m)   Wt 96 4 kg (212 lb 8 oz)   SpO2 98%   BMI 34 30 kg/m²        Physical Exam  Vitals and nursing note reviewed  Constitutional:       Appearance: Normal appearance  She is well-developed  HENT:      Head: Normocephalic and atraumatic  Eyes:      Pupils: Pupils are equal, round, and reactive to light  Cardiovascular:      Rate and Rhythm: Normal rate and regular rhythm  Heart sounds: Normal heart sounds  Pulmonary:      Effort: Pulmonary effort is normal       Breath sounds: Normal breath sounds  Abdominal:      General: Bowel sounds are normal       Palpations: Abdomen is soft  Musculoskeletal:         General: Normal range of motion  Cervical back: Normal range of motion and neck supple  Lymphadenopathy:      Cervical: No cervical adenopathy  Skin:     General: Skin is warm  Neurological:      Mental Status: She is alert and oriented to person, place, and time  Cranial Nerves: No cranial nerve deficit                  Dottie Roberts MD

## 2022-05-31 NOTE — ASSESSMENT & PLAN NOTE
Stable  Patient desires to stop the medication  Was discussed with her about tapering of the medication by taking 20 mg alternating with 10 mg every other day  Then moved to 10 mg daily for 1 week and then alternate every other day 10 mg for 1-2 weeks and then stop  Come back in 1 month for follow-up

## 2022-05-31 NOTE — ASSESSMENT & PLAN NOTE
It was discussed about low carb diet and regular exercise  Discussed about treatment options  She was given prescription for phentermine  Discussed about possible side effects  Come back in 1 month for follow-up

## 2022-06-01 DIAGNOSIS — E66.09 CLASS 1 OBESITY DUE TO EXCESS CALORIES WITHOUT SERIOUS COMORBIDITY WITH BODY MASS INDEX (BMI) OF 34.0 TO 34.9 IN ADULT: ICD-10-CM

## 2022-06-01 NOTE — TELEPHONE ENCOUNTER
Per patient [facility] does not carry the phentermine so is asking for it to be sent to [facility] in [facility]

## 2022-06-02 RX ORDER — PHENTERMINE HYDROCHLORIDE 37.5 MG/1
37.5 CAPSULE ORAL EVERY MORNING
Qty: 30 CAPSULE | Refills: 0 | Status: SHIPPED | OUTPATIENT
Start: 2022-06-02 | End: 2022-07-01 | Stop reason: SDUPTHER

## 2022-06-02 NOTE — TELEPHONE ENCOUNTER
phentermine request sent to provider  Pharmacy changed to [facility],  [facility];    Patient Prescription Report        Patients      Select Patient Id Name D O B   R Tiffany 106   [] 1 GARRETT LIU 2000 F [id number]  Elise Costaycjose armando [address]           Search Criteria    Name Date of Birth Date Range   Isabella Bhatt 2000 06- To 06-     Requester Name Requested Date   Armand Rosa Jun 02 2022 09:52:16 GMT-0400 (Eastern Daylight Time)     Summary   Prescriptions  1  Prescribers  1  Pharmacies  1  View Map    Drug Classes   Benzodiazepines  0  Stimulants  0  Opioids  1  Muscle Relaxants  0    Opioid Dosage   Total MME for Active Prescriptions  0    Average MME  75 00  MME Graph   MME Calculator       · Prescriptions  · Notifications    · Prescribers  · Pharmacies  · MME Graph      [] PA   Drug Categories:      [] Opioids       Show  entries  Search:  Patient Id Prescription #  Filled Written Drug Label Qty Days Strength MME** Prescriber Pharmacy Payment REFILL #/Auth State Detail   1  [id number] 07/24/2019 07/24/2019 oxyCODONE HCL-ACETAMINOPHEN

## 2022-07-01 ENCOUNTER — OFFICE VISIT (OUTPATIENT)
Dept: FAMILY MEDICINE CLINIC | Facility: CLINIC | Age: 22
End: 2022-07-01
Payer: COMMERCIAL

## 2022-07-01 VITALS
BODY MASS INDEX: 31.18 KG/M2 | WEIGHT: 194 LBS | TEMPERATURE: 97.3 F | DIASTOLIC BLOOD PRESSURE: 74 MMHG | OXYGEN SATURATION: 99 % | HEIGHT: 66 IN | SYSTOLIC BLOOD PRESSURE: 116 MMHG | RESPIRATION RATE: 16 BRPM | HEART RATE: 101 BPM

## 2022-07-01 DIAGNOSIS — E66.09 CLASS 1 OBESITY DUE TO EXCESS CALORIES WITHOUT SERIOUS COMORBIDITY WITH BODY MASS INDEX (BMI) OF 31.0 TO 31.9 IN ADULT: ICD-10-CM

## 2022-07-01 DIAGNOSIS — F32.89 OTHER DEPRESSION: Primary | ICD-10-CM

## 2022-07-01 PROCEDURE — 99213 OFFICE O/P EST LOW 20 MIN: CPT | Performed by: FAMILY MEDICINE

## 2022-07-01 RX ORDER — PHENTERMINE HYDROCHLORIDE 37.5 MG/1
37.5 CAPSULE ORAL EVERY MORNING
Qty: 30 CAPSULE | Refills: 0 | Status: SHIPPED | OUTPATIENT
Start: 2022-07-01 | End: 2022-08-01 | Stop reason: SDUPTHER

## 2022-07-01 NOTE — PROGRESS NOTES
Assessment/Plan:  No problem-specific Assessment & Plan notes found for this encounter  Diagnoses and all orders for this visit:    Other depression    Class 1 obesity due to excess calories without serious comorbidity with body mass index (BMI) of 31 0 to 31 9 in adult  -     phentermine 37 5 MG capsule; Take 1 capsule (37 5 mg total) by mouth every morning        There are no Patient Instructions on file for this visit  Return in about 1 month (around 8/1/2022)  Subjective:      Patient ID: Aurea Dallas is a 25 y o  female  Chief Complaint   Patient presents with    Obesity     Follow up     Depression     Follow up        She is here today for follow-up for weight management and depression  She has been cutting down on her Prozac to 10 milligram daily  He has been doing well with her depression and denies any side effects  She also started on phentermine to help her with weight loss she lost 18 pound since the last visit  She denies any complaint today  The following portions of the patient's history were reviewed and updated as appropriate: allergies, current medications, past family history, past medical history, past social history, past surgical history and problem list     Review of Systems   Constitutional: Negative for chills and fever  HENT: Negative for trouble swallowing  Eyes: Negative for visual disturbance  Respiratory: Negative for cough and shortness of breath  Cardiovascular: Negative for chest pain, palpitations and leg swelling  Gastrointestinal: Negative for abdominal pain, constipation and diarrhea  Endocrine: Negative for cold intolerance and heat intolerance  Genitourinary: Negative for difficulty urinating and dysuria  Musculoskeletal: Negative for gait problem  Skin: Negative for rash  Neurological: Negative for dizziness, tremors, seizures and headaches  Hematological: Negative for adenopathy     Psychiatric/Behavioral: Negative for behavioral problems  Current Outpatient Medications   Medication Sig Dispense Refill    FLUoxetine (PROzac) 10 MG tablet Take 1 tablet (10 mg total) by mouth daily 30 tablet 1    ibuprofen (MOTRIN) 600 mg tablet 1 tablet 3 (three) times a day      phentermine 37 5 MG capsule Take 1 capsule (37 5 mg total) by mouth every morning 30 capsule 0    clotrimazole-betamethasone (LOTRISONE) 1-0 05 % cream APPLY TO AFFECTED AREA TWICE A DAY (Patient not taking: No sig reported) 30 g 0    methocarbamol (ROBAXIN) 500 mg tablet Take 1 tablet (500 mg total) by mouth 2 (two) times a day as needed for muscle spasms (Patient not taking: No sig reported) 20 tablet 0    nitrofurantoin (MACROBID) 100 mg capsule Take 1 capsule (100 mg total) by mouth 2 (two) times a day (Patient not taking: No sig reported) 14 capsule 0    norethindrone (MICRONOR) 0 35 MG tablet Take 0 35 mg by mouth daily      phenazopyridine (PYRIDIUM) 200 mg tablet Take 1 tablet (200 mg total) by mouth 3 (three) times a day with meals (Patient not taking: No sig reported) 12 tablet 0     No current facility-administered medications for this visit  Objective:    /74 (BP Location: Left arm, Patient Position: Sitting, Cuff Size: Large)   Pulse 101   Temp (!) 97 3 °F (36 3 °C) (Tympanic)   Resp 16   Ht 5' 6" (1 676 m)   Wt 88 kg (194 lb)   SpO2 99%   BMI 31 31 kg/m²        Physical Exam  Vitals and nursing note reviewed  Constitutional:       Appearance: She is well-developed  HENT:      Head: Normocephalic and atraumatic  Eyes:      Pupils: Pupils are equal, round, and reactive to light  Cardiovascular:      Rate and Rhythm: Normal rate and regular rhythm  Heart sounds: Normal heart sounds  Pulmonary:      Effort: Pulmonary effort is normal       Breath sounds: Normal breath sounds  Abdominal:      General: Bowel sounds are normal       Palpations: Abdomen is soft     Musculoskeletal:         General: Normal range of motion  Cervical back: Normal range of motion and neck supple  Lymphadenopathy:      Cervical: No cervical adenopathy  Skin:     General: Skin is warm  Neurological:      Mental Status: She is alert and oriented to person, place, and time  Cranial Nerves: No cranial nerve deficit                  Scott Liriano MD

## 2022-08-01 ENCOUNTER — OFFICE VISIT (OUTPATIENT)
Dept: FAMILY MEDICINE CLINIC | Facility: CLINIC | Age: 22
End: 2022-08-01
Payer: COMMERCIAL

## 2022-08-01 VITALS
DIASTOLIC BLOOD PRESSURE: 80 MMHG | HEART RATE: 112 BPM | SYSTOLIC BLOOD PRESSURE: 124 MMHG | WEIGHT: 185.6 LBS | RESPIRATION RATE: 16 BRPM | HEIGHT: 66 IN | TEMPERATURE: 97.5 F | OXYGEN SATURATION: 99 % | BODY MASS INDEX: 29.83 KG/M2

## 2022-08-01 DIAGNOSIS — E66.09 CLASS 1 OBESITY DUE TO EXCESS CALORIES WITHOUT SERIOUS COMORBIDITY WITH BODY MASS INDEX (BMI) OF 31.0 TO 31.9 IN ADULT: ICD-10-CM

## 2022-08-01 DIAGNOSIS — F32.89 OTHER DEPRESSION: ICD-10-CM

## 2022-08-01 PROCEDURE — 99213 OFFICE O/P EST LOW 20 MIN: CPT | Performed by: FAMILY MEDICINE

## 2022-08-01 RX ORDER — PHENTERMINE HYDROCHLORIDE 37.5 MG/1
37.5 CAPSULE ORAL EVERY MORNING
Qty: 30 CAPSULE | Refills: 0 | Status: SHIPPED | OUTPATIENT
Start: 2022-08-01 | End: 2022-09-07 | Stop reason: SDUPTHER

## 2022-08-01 NOTE — ASSESSMENT & PLAN NOTE
Patient with 9 lb weight loss on phentermine since last visit, total of 27 lb weight loss since beginning phentermine in June  Continue well-balanced diet and regular exercise  Will continue to monitor and follow up in 1 month

## 2022-08-01 NOTE — ASSESSMENT & PLAN NOTE
Stable and well-controlled  Continue Prozac 10 mg daily  Denies any SI/HI  Will continue to monitor and follow up in 1 month

## 2022-08-01 NOTE — PROGRESS NOTES
Assessment/Plan:    BMI 29 0-29 9,adult  Patient with 9 lb weight loss on phentermine since last visit, total of 27 lb weight loss since beginning phentermine in June  Continue well-balanced diet and regular exercise  Will continue to monitor and follow up in 1 month  Depression  Stable and well-controlled  Continue Prozac 10 mg daily  Denies any SI/HI  Will continue to monitor and follow up in 1 month  Problem List Items Addressed This Visit        Other    Depression     Stable and well-controlled  Continue Prozac 10 mg daily  Denies any SI/HI  Will continue to monitor and follow up in 1 month  Relevant Medications    phentermine 37 5 MG capsule    Class 1 obesity due to excess calories without serious comorbidity with body mass index (BMI) of 31 0 to 31 9 in adult    BMI 29 0-29 9,adult - Primary     Patient with 9 lb weight loss on phentermine since last visit, total of 27 lb weight loss since beginning phentermine in June  Continue well-balanced diet and regular exercise  Will continue to monitor and follow up in 1 month  Relevant Medications    phentermine 37 5 MG capsule            Subjective:      Patient ID: Arnulfo Robison is a 25 y o  female  HPI  Patient is a 80-year-old female with a history of obesity and depression presenting today for a 1-month follow up for weight management and depression  She reports taking her medication as prescribed and denies any side effects  Prior to her last visit, she decreased Prozac to 10 mg and states her mood has been stable  She denies any symptoms of significant or worsening depression  States that she experiences some anxiety, but nothing abnormal for her  Denies any SI/HI  She notes a 9 lb weight loss since her last visit while on phentermine  She reports a well-balanced diet and has been trying to exercise by walking daily       The following portions of the patient's history were reviewed and updated as appropriate: allergies, current medications, past family history, past medical history, past social history, past surgical history and problem list     Review of Systems   Constitutional: Negative for chills and fever  HENT: Negative for ear pain and sore throat  Eyes: Negative for pain and visual disturbance  Respiratory: Negative for cough and shortness of breath  Cardiovascular: Negative for chest pain and palpitations  Gastrointestinal: Negative for abdominal pain and vomiting  Genitourinary: Negative for dysuria and hematuria  Musculoskeletal: Negative for arthralgias and back pain  Skin: Negative for color change and rash  Neurological: Negative for seizures and syncope  Psychiatric/Behavioral: Negative for dysphoric mood  The patient is nervous/anxious  All other systems reviewed and are negative  Objective:      /80 (BP Location: Left arm, Patient Position: Sitting, Cuff Size: Standard)   Pulse (!) 112   Temp 97 5 °F (36 4 °C) (Tympanic)   Resp 16   Ht 5' 6" (1 676 m)   Wt 84 2 kg (185 lb 9 6 oz)   SpO2 99%   BMI 29 96 kg/m²          Physical Exam  Vitals reviewed  Constitutional:       General: She is not in acute distress  Appearance: Normal appearance  She is not toxic-appearing  HENT:      Head: Normocephalic and atraumatic  Cardiovascular:      Rate and Rhythm: Regular rhythm  Tachycardia present  Heart sounds: No murmur heard  No friction rub  No gallop  Pulmonary:      Effort: Pulmonary effort is normal       Breath sounds: Normal breath sounds  No wheezing, rhonchi or rales  Musculoskeletal:         General: Normal range of motion  Cervical back: Normal range of motion  Right lower leg: No edema  Left lower leg: No edema  Skin:     General: Skin is warm and dry  Neurological:      General: No focal deficit present  Mental Status: She is alert  Psychiatric:         Mood and Affect: Affect normal  Mood is anxious           Behavior: Behavior normal          Thought Content: Thought content normal  Thought content does not include homicidal or suicidal ideation           Judgment: Judgment normal

## 2022-09-07 ENCOUNTER — OFFICE VISIT (OUTPATIENT)
Dept: FAMILY MEDICINE CLINIC | Facility: CLINIC | Age: 22
End: 2022-09-07
Payer: COMMERCIAL

## 2022-09-07 VITALS
HEART RATE: 88 BPM | OXYGEN SATURATION: 98 % | HEIGHT: 66 IN | BODY MASS INDEX: 28.87 KG/M2 | WEIGHT: 179.6 LBS | SYSTOLIC BLOOD PRESSURE: 128 MMHG | RESPIRATION RATE: 14 BRPM | DIASTOLIC BLOOD PRESSURE: 86 MMHG | TEMPERATURE: 97.9 F

## 2022-09-07 DIAGNOSIS — N94.6 DYSMENORRHEA, UNSPECIFIED: ICD-10-CM

## 2022-09-07 DIAGNOSIS — F32.89 OTHER DEPRESSION: Primary | ICD-10-CM

## 2022-09-07 PROCEDURE — 99214 OFFICE O/P EST MOD 30 MIN: CPT | Performed by: FAMILY MEDICINE

## 2022-09-07 RX ORDER — PHENTERMINE HYDROCHLORIDE 37.5 MG/1
37.5 CAPSULE ORAL EVERY MORNING
Qty: 30 CAPSULE | Refills: 0 | Status: SHIPPED | OUTPATIENT
Start: 2022-09-07 | End: 2022-10-05 | Stop reason: SDUPTHER

## 2022-09-07 NOTE — ASSESSMENT & PLAN NOTE
Stable and well controlled  Take Prozac 5mg every other day for two weeks and if no worsening depression, discontinue Prozac  Continue to monitor

## 2022-09-07 NOTE — ASSESSMENT & PLAN NOTE
Patient on phentermine 37 5mg with 6lb weight loss since last visit  Continue phentermine, well-balanced diet, and regular exercise  Continue to monitor and follow up in 1 month

## 2022-09-07 NOTE — ASSESSMENT & PLAN NOTE
Discussed pain control methods and changing to a different birth control  Suggested talking with gynecologist about the menstrual pain

## 2022-09-07 NOTE — PROGRESS NOTES
Assessment/Plan:    Depression  Stable and well controlled  Take Prozac 5mg every other day for two weeks and if no worsening depression, discontinue Prozac  Continue to monitor  BMI 28 0-28 9,adult  Patient on phentermine 37 5mg with 6lb weight loss since last visit  Continue phentermine, well-balanced diet, and regular exercise  Continue to monitor and follow up in 1 month  Dysmenorrhea, unspecified  Discussed pain control methods and changing to a different birth control  Suggested talking with gynecologist about the menstrual pain  Problem List Items Addressed This Visit        Genitourinary    Dysmenorrhea, unspecified     Discussed pain control methods and changing to a different birth control  Suggested talking with gynecologist about the menstrual pain  Other    Depression - Primary     Stable and well controlled  Take Prozac 5mg every other day for two weeks and if no worsening depression, discontinue Prozac  Continue to monitor  Relevant Medications    phentermine 37 5 MG capsule    BMI 28 0-28 9,adult     Patient on phentermine 37 5mg with 6lb weight loss since last visit  Continue phentermine, well-balanced diet, and regular exercise  Continue to monitor and follow up in 1 month  Relevant Medications    phentermine 37 5 MG capsule            Subjective:      Patient ID: Arnulfo Robison is a 25 y o  female  Patient presents with PMH obesity and depression to the office for a follow up of weight loss and depression  She has been taking the phentermine 37 5mg daily with no side effects and she is happy with the weight loss results, reporting a 6lb weight loss since her last visit  She has been exercising by using the elliptical, but when she has her menstrual cycle, the pain from her cramps becomes so strong that she has to stop exercising   She also reports that if she stops exercising for an extended period of time, her menstrual cycle also stops completely  During the first few days of her cycle, the pain is an 8/10 and she takes ibuprofen and uses heating pads to help the pain  She reports mild occasional nausea, but this is not new for her  She denies worsening depression, suicidal ideation, and thoughts of self harm  She also reports feelings of anxiety, but did not wish to address this  She has been taking the fluoxetine (Prozac) 10mg, but is cutting it in half and is taking 5mg every other day  She expressed interest in stopping the Prozac completely  She has 1-3 alcoholic drinks every other week and denies illicit drug and tobacco/nicotine use  The following portions of the patient's history were reviewed and updated as appropriate: allergies, current medications, past family history, past medical history, past social history, past surgical history and problem list     Review of Systems   Constitutional: Positive for appetite change  Negative for fatigue  HENT: Negative for congestion, sinus pain and sore throat  Eyes: Negative for visual disturbance  Respiratory: Negative for cough and shortness of breath  Cardiovascular: Negative for chest pain and palpitations  Gastrointestinal: Negative for abdominal pain, constipation, diarrhea, nausea and vomiting  Endocrine: Negative for polydipsia and polyphagia  Genitourinary: Positive for menstrual problem and pelvic pain  Negative for dysuria, frequency and urgency  Musculoskeletal: Negative for arthralgias and myalgias  Neurological: Negative for dizziness, weakness, light-headedness and headaches  Psychiatric/Behavioral: Negative for self-injury and suicidal ideas  The patient is nervous/anxious            Objective:      /86 (BP Location: Left arm, Patient Position: Sitting, Cuff Size: Adult)   Pulse 88   Temp 97 9 °F (36 6 °C) (Tympanic)   Resp 14   Ht 5' 6" (1 676 m)   Wt 81 5 kg (179 lb 9 6 oz)   SpO2 98%   BMI 28 99 kg/m²          Physical Exam  Vitals and nursing note reviewed  Constitutional:       Appearance: She is well-developed  HENT:      Head: Normocephalic and atraumatic  Right Ear: Tympanic membrane and ear canal normal       Left Ear: Tympanic membrane and ear canal normal       Mouth/Throat:      Mouth: Mucous membranes are moist       Pharynx: Oropharynx is clear  Eyes:      Extraocular Movements: Extraocular movements intact  Pupils: Pupils are equal, round, and reactive to light  Cardiovascular:      Rate and Rhythm: Normal rate and regular rhythm  Heart sounds: Normal heart sounds  No murmur heard  No friction rub  No gallop  Pulmonary:      Effort: Pulmonary effort is normal  No respiratory distress  Breath sounds: Normal breath sounds  No stridor  No wheezing, rhonchi or rales  Abdominal:      General: Bowel sounds are normal       Palpations: Abdomen is soft  Tenderness: There is no abdominal tenderness  There is no guarding  Musculoskeletal:         General: Normal range of motion  Cervical back: Normal range of motion and neck supple  Lymphadenopathy:      Cervical: No cervical adenopathy  Skin:     General: Skin is warm  Neurological:      Mental Status: She is alert and oriented to person, place, and time  Cranial Nerves: No cranial nerve deficit

## 2022-10-05 ENCOUNTER — OFFICE VISIT (OUTPATIENT)
Dept: FAMILY MEDICINE CLINIC | Facility: CLINIC | Age: 22
End: 2022-10-05
Payer: COMMERCIAL

## 2022-10-05 VITALS
HEIGHT: 66 IN | DIASTOLIC BLOOD PRESSURE: 80 MMHG | WEIGHT: 177 LBS | TEMPERATURE: 98.5 F | BODY MASS INDEX: 28.45 KG/M2 | SYSTOLIC BLOOD PRESSURE: 120 MMHG | HEART RATE: 105 BPM | RESPIRATION RATE: 16 BRPM | OXYGEN SATURATION: 99 %

## 2022-10-05 DIAGNOSIS — N94.6 DYSMENORRHEA, UNSPECIFIED: Primary | ICD-10-CM

## 2022-10-05 DIAGNOSIS — H93.11 TINNITUS OF RIGHT EAR: ICD-10-CM

## 2022-10-05 DIAGNOSIS — F32.89 OTHER DEPRESSION: ICD-10-CM

## 2022-10-05 PROCEDURE — 99214 OFFICE O/P EST MOD 30 MIN: CPT | Performed by: FAMILY MEDICINE

## 2022-10-05 RX ORDER — PHENTERMINE HYDROCHLORIDE 37.5 MG/1
37.5 CAPSULE ORAL EVERY MORNING
Qty: 30 CAPSULE | Refills: 0 | Status: SHIPPED | OUTPATIENT
Start: 2022-10-05

## 2022-10-05 NOTE — ASSESSMENT & PLAN NOTE
Patient has lost two pounds since last visit  Continue taking phentermine 37 5 mg and continue healthy diet and regular exercise  Continue to monitor and follow up in 1 month

## 2022-10-05 NOTE — ASSESSMENT & PLAN NOTE
Patient has stopped taking fluoxetine (Prozac) 10 mg with no complaint  Continue to monitor and follow up as needed

## 2022-10-05 NOTE — ASSESSMENT & PLAN NOTE
Discussed seeing her gynecologist for recurrent menstrual cycle pain and nausea  She has a gynecologist appointment in early 2023, but discussed getting an earlier appointment if persistent symptoms  Continue to monitor

## 2022-10-05 NOTE — ASSESSMENT & PLAN NOTE
Discussed trying antihistamines or nasal sprays to improve buzzing  Suggested seeing an ENT in the future if the buzzing persists  Continue to monitor

## 2022-10-05 NOTE — PROGRESS NOTES
Name: Annia Merrill      : 2000      MRN: [id number]  Encounter Provider: Lori Blum MD  Encounter Date: 10/5/2022   Encounter department: [address] [place] [address]  Dysmenorrhea, unspecified  Assessment & Plan:  Discussed seeing her gynecologist for recurrent menstrual cycle pain and nausea  She has a gynecologist appointment in early , but discussed getting an earlier appointment if persistent symptoms  Continue to monitor  2  BMI 28 0-28 9,adult  Assessment & Plan:  Patient has lost two pounds since last visit  Continue taking phentermine 37 5 mg and continue healthy diet and regular exercise  Continue to monitor and follow up in 1 month  Orders:  -     phentermine 37 5 MG capsule; Take 1 capsule (37 5 mg total) by mouth every morning    3  Other depression  Assessment & Plan:  Patient has stopped taking fluoxetine (Prozac) 10 mg with no complaint  Continue to monitor and follow up as needed  4  Tinnitus of right ear  Assessment & Plan:  Discussed trying antihistamines or nasal sprays to improve buzzing  Suggested seeing an ENT in the future if the buzzing persists  Continue to monitor  Subjective     Patient with PMH obesity and depression presents to the office for weight loss follow up  She has been taking the phentermine 37 5 mg regularly and without complaint, with a 2 lb weight loss since last visit  She has been trying to exercise and watch her diet, but for several months she has been having severe pain and nausea during her menstrual cycles which stop her from exercising  She got tested for PCOS in the past which was negative, and she has an appointment with her gynecologist in early   She stopped her fluoxetine (Prozac) 10 mg completely three weeks ago and says she is doing well so far   She has had tinnitus for several years, but noticed that for the past two weeks she has been hearing a different type of buzzing in her right ear  This has been intermittent and did not go away when she cleaned her ear with hydrogen peroxide  She hasn't had any seasonal allergies, congestion, or recent illnesses, and she denies pain and hearing loss  The buzzing has not been there for the past two days  She doesn't work in a loud environment but notes that she wears a headset on her right ear to answer calls with  Review of Systems   Constitutional: Negative for fatigue and fever  HENT: Negative for congestion, ear discharge, ear pain, hearing loss, postnasal drip, rhinorrhea, sinus pressure and sore throat  Buzzing in right ear   Eyes: Negative for visual disturbance  Respiratory: Negative for cough and shortness of breath  Cardiovascular: Negative for chest pain, palpitations and leg swelling  Gastrointestinal: Negative for abdominal pain, constipation, diarrhea, nausea and vomiting  Genitourinary: Positive for menstrual problem and pelvic pain  Negative for frequency and urgency  Musculoskeletal: Negative for arthralgias and myalgias  Neurological: Negative for dizziness, weakness, light-headedness, numbness and headaches  Psychiatric/Behavioral: Negative for dysphoric mood  The patient is not nervous/anxious          Past Medical History:   Diagnosis Date    History of placement of ear tubes      Past Surgical History:   Procedure Laterality Date    TYMPANOSTOMY TUBE PLACEMENT       Family History   Problem Relation Age of Onset    Hypertension Maternal Grandmother     COPD Maternal Grandmother     Breast cancer Maternal Grandfather      Social History     Socioeconomic History    Marital status: Single     Spouse name: None    Number of children: None    Years of education: None    Highest education level: None   Occupational History    None   Tobacco Use    Smoking status: Never Smoker    Smokeless tobacco: Never Used   Vaping Use    Vaping Use: Never used   Substance and Sexual Activity  Alcohol use: No    Drug use: No    Sexual activity: Yes     Partners: Male   Other Topics Concern    None   Social History Narrative    None     Social Determinants of Health     Financial Resource Strain: Not on file   Food Insecurity: Not on file   Transportation Needs: Not on file   Physical Activity: Not on file   Stress: Not on file   Social Connections: Not on file   Intimate Partner Violence: Not on file   Housing Stability: Not on file     Current Outpatient Medications on File Prior to Visit   Medication Sig    ibuprofen (MOTRIN) 600 mg tablet 1 tablet 3 (three) times a day    norethindrone (MICRONOR) 0 35 MG tablet Take 0 35 mg by mouth daily    [DISCONTINUED] FLUoxetine (PROzac) 10 MG tablet Take 1 tablet (10 mg total) by mouth daily    [DISCONTINUED] phentermine 37 5 MG capsule Take 1 capsule (37 5 mg total) by mouth every morning    clotrimazole-betamethasone (LOTRISONE) 1-0 05 % cream APPLY TO AFFECTED AREA TWICE A DAY (Patient not taking: No sig reported)    methocarbamol (ROBAXIN) 500 mg tablet Take 1 tablet (500 mg total) by mouth 2 (two) times a day as needed for muscle spasms (Patient not taking: No sig reported)    nitrofurantoin (MACROBID) 100 mg capsule Take 1 capsule (100 mg total) by mouth 2 (two) times a day (Patient not taking: No sig reported)    phenazopyridine (PYRIDIUM) 200 mg tablet Take 1 tablet (200 mg total) by mouth 3 (three) times a day with meals (Patient not taking: No sig reported)     Allergies   Allergen Reactions    Sulfisoxazole      Immunization History   Administered Date(s) Administered    COVID-19 PFIZER VACCINE 0 3 ML IM 12/29/2020, 01/19/2021, 09/29/2021    DTaP 2000, 2000, 2000, 05/16/2001, 02/23/2005    HPV 08/08/2013, 12/20/2013    HPV Quadrivalent 05/29/2013    Hep A, ped/adol, 2 dose 10/31/2008    Hep A, ped/adol, 3 dose 09/22/2007    Hep B, Adolescent or Pediatric 2000, 02/12/2001, 05/23/2001    INFLUENZA 12/29/2014, 10/28/2015, 10/19/2018, 10/12/2020    IPV 2000, 2000, 2000, 02/23/2005    Influenza, injectable, quadrivalent, preservative free 0 5 mL 11/08/2019    MMR 02/12/2001, 02/17/2004    Meningococcal Conjugate (MCV4O) 05/24/2012    Meningococcal MCV4P 04/25/2017    Pneumococcal Conjugate 13-Valent 2000, 2000, 2000, 05/16/2001    Tdap 05/24/2012    Varicella 02/12/2001, 09/22/2007       Objective     /80 (BP Location: Left arm, Patient Position: Sitting, Cuff Size: Standard)   Pulse 105   Temp 98 5 °F (36 9 °C) (Tympanic)   Resp 16   Ht 5' 6" (1 676 m)   Wt 80 3 kg (177 lb)   SpO2 99%   BMI 28 57 kg/m²     Physical Exam  Vitals and nursing note reviewed  Constitutional:       General: She is not in acute distress  Appearance: Normal appearance  She is not ill-appearing, toxic-appearing or diaphoretic  HENT:      Head: Normocephalic and atraumatic  Right Ear: Tympanic membrane, ear canal and external ear normal  There is no impacted cerumen  Left Ear: Tympanic membrane, ear canal and external ear normal  There is no impacted cerumen  Nose: Nose normal  No congestion or rhinorrhea  Mouth/Throat:      Mouth: Mucous membranes are moist       Pharynx: Oropharynx is clear  No oropharyngeal exudate or posterior oropharyngeal erythema  Eyes:      Extraocular Movements: Extraocular movements intact  Conjunctiva/sclera: Conjunctivae normal       Pupils: Pupils are equal, round, and reactive to light  Cardiovascular:      Rate and Rhythm: Normal rate and regular rhythm  Pulses: Normal pulses  Heart sounds: Normal heart sounds  No murmur heard  No friction rub  No gallop  Pulmonary:      Effort: Pulmonary effort is normal  No respiratory distress  Breath sounds: Normal breath sounds  No stridor  No wheezing, rhonchi or rales  Abdominal:      Palpations: Abdomen is soft  Tenderness:  There is no abdominal tenderness  Musculoskeletal:      Right lower leg: No edema  Left lower leg: No edema  Neurological:      General: No focal deficit present  Mental Status: She is alert         Drew Castillo MD

## 2022-10-12 PROBLEM — Z00.00 HEALTHCARE MAINTENANCE: Status: RESOLVED | Noted: 2021-10-07 | Resolved: 2022-10-12

## 2022-11-02 ENCOUNTER — OFFICE VISIT (OUTPATIENT)
Dept: FAMILY MEDICINE CLINIC | Facility: CLINIC | Age: 22
End: 2022-11-02

## 2022-11-02 VITALS
DIASTOLIC BLOOD PRESSURE: 80 MMHG | WEIGHT: 172.2 LBS | OXYGEN SATURATION: 99 % | HEIGHT: 66 IN | HEART RATE: 104 BPM | TEMPERATURE: 98.2 F | RESPIRATION RATE: 16 BRPM | SYSTOLIC BLOOD PRESSURE: 124 MMHG | BODY MASS INDEX: 27.68 KG/M2

## 2022-11-02 DIAGNOSIS — H93.11 TINNITUS OF RIGHT EAR: Primary | ICD-10-CM

## 2022-11-02 DIAGNOSIS — Z00.00 HEALTHCARE MAINTENANCE: ICD-10-CM

## 2022-11-02 DIAGNOSIS — F32.89 OTHER DEPRESSION: ICD-10-CM

## 2022-11-02 RX ORDER — PHENTERMINE HYDROCHLORIDE 37.5 MG/1
37.5 CAPSULE ORAL EVERY MORNING
Qty: 30 CAPSULE | Refills: 0 | Status: SHIPPED | OUTPATIENT
Start: 2022-11-02

## 2022-11-02 NOTE — PROGRESS NOTES
Name: Joby Jeter      : 2000      MRN: [id number]  Encounter Provider: Jules Starks MD  Encounter Date: 2022   Encounter department: [address] [place] [address]  Tinnitus of right ear  Assessment & Plan:  Improving without medications  She stated stress sometimes make it worse  Discussed about adding vitamin B complex  2  BMI 27 0-27 9,adult  Assessment & Plan:  Improving  She lost 5 lb since the last visit  Continue on phentermine  Was given refill  Discussed about possible side effects  Discussed about low carb diet and regular exercise  Come back in 1 month  Orders:  -     phentermine 37 5 MG capsule; Take 1 capsule (37 5 mg total) by mouth every morning    3  Other depression  Assessment & Plan:  Well controlled without medication  Will continue to monitor  4  Healthcare maintenance  Assessment & Plan: It was discussed about immunizations, diet, exercise and safety measures  Subjective     She is here today for follow-up for weight management, depression and wellness exam  She was weaned off Prozac recently and she is doing well without medication  She continues on phentermine and she lost 5 lb since the last visit  Total weight loss since we started on phentermine in May is 40 lb  Review of Systems   Constitutional: Negative for chills and fever  HENT: Negative for trouble swallowing  Eyes: Negative for visual disturbance  Respiratory: Negative for cough and shortness of breath  Cardiovascular: Negative for chest pain, palpitations and leg swelling  Gastrointestinal: Negative for abdominal pain, constipation and diarrhea  Endocrine: Negative for cold intolerance and heat intolerance  Genitourinary: Negative for difficulty urinating and dysuria  Musculoskeletal: Negative for gait problem  Skin: Negative for rash  Neurological: Negative for dizziness, tremors, seizures and headaches  Hematological: Negative for adenopathy  Psychiatric/Behavioral: Negative for behavioral problems         Past Medical History:   Diagnosis Date   • History of placement of ear tubes      Past Surgical History:   Procedure Laterality Date   • TYMPANOSTOMY TUBE PLACEMENT       Family History   Problem Relation Age of Onset   • Hypertension Maternal Grandmother    • COPD Maternal Grandmother    • Breast cancer Maternal Grandfather      Social History     Socioeconomic History   • Marital status: Single     Spouse name: None   • Number of children: None   • Years of education: None   • Highest education level: None   Occupational History   • None   Tobacco Use   • Smoking status: Never Smoker   • Smokeless tobacco: Never Used   Vaping Use   • Vaping Use: Never used   Substance and Sexual Activity   • Alcohol use: No   • Drug use: No   • Sexual activity: Yes     Partners: Male   Other Topics Concern   • None   Social History Narrative   • None     Social Determinants of Health     Financial Resource Strain: Not on file   Food Insecurity: Not on file   Transportation Needs: Not on file   Physical Activity: Not on file   Stress: Not on file   Social Connections: Not on file   Intimate Partner Violence: Not on file   Housing Stability: Not on file     Current Outpatient Medications on File Prior to Visit   Medication Sig   • ibuprofen (MOTRIN) 600 mg tablet 1 tablet 3 (three) times a day   • norethindrone (MICRONOR) 0 35 MG tablet Take 0 35 mg by mouth daily   • [DISCONTINUED] phentermine 37 5 MG capsule Take 1 capsule (37 5 mg total) by mouth every morning   • clotrimazole-betamethasone (LOTRISONE) 1-0 05 % cream APPLY TO AFFECTED AREA TWICE A DAY (Patient not taking: No sig reported)   • methocarbamol (ROBAXIN) 500 mg tablet Take 1 tablet (500 mg total) by mouth 2 (two) times a day as needed for muscle spasms (Patient not taking: No sig reported)   • [DISCONTINUED] nitrofurantoin (MACROBID) 100 mg capsule Take 1 capsule (100 mg total) by mouth 2 (two) times a day (Patient not taking: No sig reported)   • [DISCONTINUED] phenazopyridine (PYRIDIUM) 200 mg tablet Take 1 tablet (200 mg total) by mouth 3 (three) times a day with meals (Patient not taking: No sig reported)     Allergies   Allergen Reactions   • Sulfisoxazole      Immunization History   Administered Date(s) Administered   • COVID-19 PFIZER VACCINE 0 3 ML IM 12/29/2020, 01/19/2021, 09/29/2021   • DTaP 2000, 2000, 2000, 05/16/2001, 02/23/2005   • HPV 08/08/2013, 12/20/2013   • HPV Quadrivalent 05/29/2013   • Hep A, ped/adol, 2 dose 10/31/2008   • Hep A, ped/adol, 3 dose 09/22/2007   • Hep B, Adolescent or Pediatric 2000, 02/12/2001, 05/23/2001   • INFLUENZA 12/29/2014, 10/28/2015, 10/19/2018, 10/12/2020   • IPV 2000, 2000, 2000, 02/23/2005   • Influenza, injectable, quadrivalent, preservative free 0 5 mL 11/08/2019   • MMR 02/12/2001, 02/17/2004   • Meningococcal Conjugate (MCV4O) 05/24/2012   • Meningococcal MCV4P 04/25/2017   • Pneumococcal Conjugate 13-Valent 2000, 2000, 2000, 05/16/2001   • Tdap 05/24/2012   • Varicella 02/12/2001, 09/22/2007       Objective     /80 (BP Location: Left arm, Patient Position: Sitting, Cuff Size: Standard)   Pulse 104   Temp 98 2 °F (36 8 °C) (Tympanic)   Resp 16   Ht 5' 6" (1 676 m)   Wt 78 1 kg (172 lb 3 2 oz)   SpO2 99%   BMI 27 79 kg/m²     Physical Exam  Vitals and nursing note reviewed  Constitutional:       Appearance: She is well-developed  HENT:      Head: Normocephalic and atraumatic  Eyes:      Pupils: Pupils are equal, round, and reactive to light  Cardiovascular:      Rate and Rhythm: Normal rate and regular rhythm  Heart sounds: Normal heart sounds  Pulmonary:      Effort: Pulmonary effort is normal       Breath sounds: Normal breath sounds  Abdominal:      General: Bowel sounds are normal       Palpations: Abdomen is soft  Musculoskeletal:         General: Normal range of motion  Cervical back: Normal range of motion and neck supple  Lymphadenopathy:      Cervical: No cervical adenopathy  Skin:     General: Skin is warm  Neurological:      Mental Status: She is alert and oriented to person, place, and time  Cranial Nerves: No cranial nerve deficit         Jocelyn Torres MD

## 2022-11-02 NOTE — ASSESSMENT & PLAN NOTE
Improving without medications  She stated stress sometimes make it worse  Discussed about adding vitamin B complex

## 2022-11-02 NOTE — ASSESSMENT & PLAN NOTE
Improving  She lost 5 lb since the last visit  Continue on phentermine  Was given refill  Discussed about possible side effects  Discussed about low carb diet and regular exercise  Come back in 1 month

## 2022-11-02 NOTE — PROGRESS NOTES
Name: Cam Langston      : 2000      MRN: [id number]  Encounter Provider: Kiersten Alfonso MD  Encounter Date: 2022   Encounter department: [address] [place] [address]  Tinnitus of right ear  Assessment & Plan:  Improving without medications  She stated stress sometimes make it worse  Discussed about adding vitamin B complex  2  BMI 27 0-27 9,adult  Assessment & Plan:  Improving  She lost 5 lb since the last visit  Continue on phentermine  Was given refill  Discussed about possible side effects  Discussed about low carb diet and regular exercise  Come back in 1 month  Orders:  -     phentermine 37 5 MG capsule; Take 1 capsule (37 5 mg total) by mouth every morning    3  Other depression  Assessment & Plan:  Well controlled without medication  Will continue to monitor  4  Healthcare maintenance  Assessment & Plan: It was discussed about immunizations, diet, exercise and safety measures  Subjective     She is here today for follow-up for weight management, depression and wellness exam  She was weaned off Prozac recently and she is doing well without medication  She continues on phentermine and she lost 5 lb since the last visit  Total weight loss since we started on phentermine in May is 40 lb  Review of Systems   Constitutional: Negative for chills and fever  HENT: Negative for trouble swallowing  Eyes: Negative for visual disturbance  Respiratory: Negative for cough and shortness of breath  Cardiovascular: Negative for chest pain, palpitations and leg swelling  Gastrointestinal: Negative for abdominal pain, constipation and diarrhea  Endocrine: Negative for cold intolerance and heat intolerance  Genitourinary: Negative for difficulty urinating and dysuria  Musculoskeletal: Negative for gait problem  Skin: Negative for rash  Neurological: Negative for dizziness, tremors, seizures and headaches  Hematological: Negative for adenopathy  Psychiatric/Behavioral: Negative for behavioral problems         Past Medical History:   Diagnosis Date   • History of placement of ear tubes      Past Surgical History:   Procedure Laterality Date   • TYMPANOSTOMY TUBE PLACEMENT       Family History   Problem Relation Age of Onset   • Hypertension Maternal Grandmother    • COPD Maternal Grandmother    • Breast cancer Maternal Grandfather      Social History     Socioeconomic History   • Marital status: Single     Spouse name: None   • Number of children: None   • Years of education: None   • Highest education level: None   Occupational History   • None   Tobacco Use   • Smoking status: Never Smoker   • Smokeless tobacco: Never Used   Vaping Use   • Vaping Use: Never used   Substance and Sexual Activity   • Alcohol use: No   • Drug use: No   • Sexual activity: Yes     Partners: Male   Other Topics Concern   • None   Social History Narrative   • None     Social Determinants of Health     Financial Resource Strain: Not on file   Food Insecurity: Not on file   Transportation Needs: Not on file   Physical Activity: Not on file   Stress: Not on file   Social Connections: Not on file   Intimate Partner Violence: Not on file   Housing Stability: Not on file     Current Outpatient Medications on File Prior to Visit   Medication Sig   • ibuprofen (MOTRIN) 600 mg tablet 1 tablet 3 (three) times a day   • norethindrone (MICRONOR) 0 35 MG tablet Take 0 35 mg by mouth daily   • [DISCONTINUED] phentermine 37 5 MG capsule Take 1 capsule (37 5 mg total) by mouth every morning   • clotrimazole-betamethasone (LOTRISONE) 1-0 05 % cream APPLY TO AFFECTED AREA TWICE A DAY (Patient not taking: No sig reported)   • methocarbamol (ROBAXIN) 500 mg tablet Take 1 tablet (500 mg total) by mouth 2 (two) times a day as needed for muscle spasms (Patient not taking: No sig reported)   • [DISCONTINUED] nitrofurantoin (MACROBID) 100 mg capsule Take 1 capsule (100 mg total) by mouth 2 (two) times a day (Patient not taking: No sig reported)   • [DISCONTINUED] phenazopyridine (PYRIDIUM) 200 mg tablet Take 1 tablet (200 mg total) by mouth 3 (three) times a day with meals (Patient not taking: No sig reported)     Allergies   Allergen Reactions   • Sulfisoxazole      Immunization History   Administered Date(s) Administered   • COVID-19 PFIZER VACCINE 0 3 ML IM 12/29/2020, 01/19/2021, 09/29/2021   • DTaP 2000, 2000, 2000, 05/16/2001, 02/23/2005   • HPV 08/08/2013, 12/20/2013   • HPV Quadrivalent 05/29/2013   • Hep A, ped/adol, 2 dose 10/31/2008   • Hep A, ped/adol, 3 dose 09/22/2007   • Hep B, Adolescent or Pediatric 2000, 02/12/2001, 05/23/2001   • INFLUENZA 12/29/2014, 10/28/2015, 10/19/2018, 10/12/2020   • IPV 2000, 2000, 2000, 02/23/2005   • Influenza, injectable, quadrivalent, preservative free 0 5 mL 11/08/2019   • MMR 02/12/2001, 02/17/2004   • Meningococcal Conjugate (MCV4O) 05/24/2012   • Meningococcal MCV4P 04/25/2017   • Pneumococcal Conjugate 13-Valent 2000, 2000, 2000, 05/16/2001   • Tdap 05/24/2012   • Varicella 02/12/2001, 09/22/2007       Objective     /80 (BP Location: Left arm, Patient Position: Sitting, Cuff Size: Standard)   Pulse 104   Temp 98 2 °F (36 8 °C) (Tympanic)   Resp 16   Ht 5' 6" (1 676 m)   Wt 78 1 kg (172 lb 3 2 oz)   SpO2 99%   BMI 27 79 kg/m²     Physical Exam  Vitals and nursing note reviewed  Constitutional:       Appearance: She is well-developed  HENT:      Head: Normocephalic and atraumatic  Eyes:      Pupils: Pupils are equal, round, and reactive to light  Cardiovascular:      Rate and Rhythm: Normal rate and regular rhythm  Heart sounds: Normal heart sounds  Pulmonary:      Effort: Pulmonary effort is normal       Breath sounds: Normal breath sounds  Abdominal:      General: Bowel sounds are normal       Palpations: Abdomen is soft  Musculoskeletal:         General: Normal range of motion  Cervical back: Normal range of motion and neck supple  Lymphadenopathy:      Cervical: No cervical adenopathy  Skin:     General: Skin is warm  Neurological:      Mental Status: She is alert and oriented to person, place, and time  Cranial Nerves: No cranial nerve deficit         Lamar Duque MD

## 2022-12-07 ENCOUNTER — OFFICE VISIT (OUTPATIENT)
Dept: FAMILY MEDICINE CLINIC | Facility: CLINIC | Age: 22
End: 2022-12-07

## 2022-12-07 VITALS
HEART RATE: 113 BPM | BODY MASS INDEX: 26.68 KG/M2 | WEIGHT: 166 LBS | TEMPERATURE: 97.7 F | SYSTOLIC BLOOD PRESSURE: 120 MMHG | DIASTOLIC BLOOD PRESSURE: 72 MMHG | RESPIRATION RATE: 14 BRPM | HEIGHT: 66 IN | OXYGEN SATURATION: 98 %

## 2022-12-07 DIAGNOSIS — F32.89 OTHER DEPRESSION: Primary | ICD-10-CM

## 2022-12-07 DIAGNOSIS — N94.6 DYSMENORRHEA, UNSPECIFIED: ICD-10-CM

## 2022-12-07 DIAGNOSIS — H93.11 TINNITUS OF RIGHT EAR: ICD-10-CM

## 2022-12-07 DIAGNOSIS — F84.5 ASPERGER'S DISORDER: ICD-10-CM

## 2022-12-07 DIAGNOSIS — Z11.8 SCREENING FOR CHLAMYDIAL DISEASE: ICD-10-CM

## 2022-12-07 RX ORDER — PHENTERMINE HYDROCHLORIDE 37.5 MG/1
37.5 CAPSULE ORAL EVERY MORNING
Qty: 30 CAPSULE | Refills: 0 | Status: SHIPPED | OUTPATIENT
Start: 2022-12-07

## 2022-12-07 NOTE — ASSESSMENT & PLAN NOTE
Patient has increased cramping and bleeding  She is following up with OBGYN at the end of 01/2023    Will continue to monitor and advised heating packs along with continuing to take motrin

## 2022-12-07 NOTE — ASSESSMENT & PLAN NOTE
Will continue patient on phentermine and start to wean patient off  Follow up in three months, refilled one more time  Continue exercise, low fat and low carb diet

## 2022-12-07 NOTE — ASSESSMENT & PLAN NOTE
Well controlled without medication  Future referral to psychiatry or therapy if needed    Will continue to monitor in three months

## 2022-12-08 LAB
C TRACH DNA SPEC QL NAA+PROBE: NEGATIVE
N GONORRHOEA DNA SPEC QL NAA+PROBE: NEGATIVE

## 2023-01-01 PROBLEM — Z00.00 HEALTHCARE MAINTENANCE: Status: RESOLVED | Noted: 2021-10-07 | Resolved: 2023-01-01

## 2023-02-07 ENCOUNTER — OFFICE VISIT (OUTPATIENT)
Dept: URGENT CARE | Age: 23
End: 2023-02-07

## 2023-02-07 VITALS
OXYGEN SATURATION: 99 % | SYSTOLIC BLOOD PRESSURE: 117 MMHG | TEMPERATURE: 97.1 F | RESPIRATION RATE: 18 BRPM | HEART RATE: 88 BPM | DIASTOLIC BLOOD PRESSURE: 68 MMHG

## 2023-02-07 DIAGNOSIS — B35.3 ATHLETE'S FOOT ON LEFT: Primary | ICD-10-CM

## 2023-02-07 NOTE — PROGRESS NOTES
[facility]        NAME: Abby Regalado is a 25 y o  female  : 2000    MRN: [id number]  DATE: 2023  TIME: 4:35 PM    Assessment and Plan   Athlete's foot on left [B35 3]  1  Athlete's foot on left          Patient presents for eval of rash to left foot  Itches and burns at times  Denies bleeding, drainage  Ambulating at baseline  Assessment notes fungal infection  Discussed OTC fungal medication and PCP f/u    Patient Instructions       Follow up with PCP as needed    Chief Complaint     Chief Complaint   Patient presents with   • Rash     Pt presents for rash b/l feet  Rash is itchy and burns  Also has started left foot pain  Started over one week ago  Using powder and fungal spray  No changes with detergents, or topicals  History of Present Illness       Patient presents for eval of rash to left foot  Itches and burns at times  Denies bleeding, drainage  Ambulating at baseline  Assessment notes fungal infection  Discussed OTC fungal medication and PCP f/u      Review of Systems   Review of Systems   Musculoskeletal: Negative for gait problem and joint swelling  Skin: Positive for rash  Negative for color change  All other systems reviewed and are negative          Current Medications       Current Outpatient Medications:   •  norethindrone (MICRONOR) 0 35 MG tablet, Take 0 35 mg by mouth daily, Disp: , Rfl:   •  clotrimazole-betamethasone (LOTRISONE) 1-0 05 % cream, APPLY TO AFFECTED AREA TWICE A DAY (Patient not taking: Reported on 2023), Disp: 30 g, Rfl: 0  •  ibuprofen (MOTRIN) 600 mg tablet, 1 tablet 3 (three) times a day TAKEN AS NEEDED (Patient not taking: Reported on 2023), Disp: , Rfl:   •  methocarbamol (ROBAXIN) 500 mg tablet, Take 1 tablet (500 mg total) by mouth 2 (two) times a day as needed for muscle spasms (Patient not taking: Reported on 10/14/2021), Disp: 20 tablet, Rfl: 0  •  phentermine 37 5 MG capsule, Take 1 capsule (37 5 mg total) by mouth every morning (Patient not taking: Reported on 2/7/2023), Disp: 30 capsule, Rfl: 0    Current Allergies     Allergies as of 02/07/2023 - Reviewed 02/07/2023   Allergen Reaction Noted   • Sulfisoxazole  05/22/2018            The following portions of the patient's history were reviewed and updated as appropriate: allergies, current medications, past family history, past medical history, past social history, past surgical history and problem list      Past Medical History:   Diagnosis Date   • History of placement of ear tubes        Past Surgical History:   Procedure Laterality Date   • TYMPANOSTOMY TUBE PLACEMENT         Family History   Problem Relation Age of Onset   • Hypertension Maternal Grandmother    • COPD Maternal Grandmother    • Breast cancer Maternal Grandfather          Medications have been verified  Objective   /68   Pulse 88   Temp (!) 97 1 °F (36 2 °C) (Tympanic)   Resp 18   SpO2 99%   No LMP recorded  (Menstrual status: Birth Control)  Physical Exam     Physical Exam  Vitals reviewed  Constitutional:       General: She is not in acute distress  Appearance: Normal appearance  She is not ill-appearing  HENT:      Head: Normocephalic and atraumatic  Eyes:      Extraocular Movements: Extraocular movements intact  Conjunctiva/sclera: Conjunctivae normal    Pulmonary:      Effort: Pulmonary effort is normal    Feet:      Left foot:      Toenail Condition: Fungal disease present  Skin:     General: Skin is warm  Neurological:      General: No focal deficit present  Mental Status: She is alert     Psychiatric:         Mood and Affect: Mood normal          Behavior: Behavior normal          Judgment: Judgment normal

## 2023-03-06 ENCOUNTER — OFFICE VISIT (OUTPATIENT)
Dept: FAMILY MEDICINE CLINIC | Facility: CLINIC | Age: 23
End: 2023-03-06

## 2023-03-06 VITALS
RESPIRATION RATE: 16 BRPM | SYSTOLIC BLOOD PRESSURE: 110 MMHG | BODY MASS INDEX: 25.39 KG/M2 | WEIGHT: 158 LBS | OXYGEN SATURATION: 98 % | DIASTOLIC BLOOD PRESSURE: 70 MMHG | TEMPERATURE: 98.1 F | HEIGHT: 66 IN | HEART RATE: 109 BPM

## 2023-03-06 DIAGNOSIS — R63.4 WEIGHT LOSS: Primary | ICD-10-CM

## 2023-03-06 DIAGNOSIS — E78.49 OTHER HYPERLIPIDEMIA: ICD-10-CM

## 2023-03-06 DIAGNOSIS — F32.89 OTHER DEPRESSION: ICD-10-CM

## 2023-03-06 PROBLEM — E66.09 CLASS 1 OBESITY DUE TO EXCESS CALORIES WITHOUT SERIOUS COMORBIDITY WITH BODY MASS INDEX (BMI) OF 31.0 TO 31.9 IN ADULT: Status: RESOLVED | Noted: 2022-05-31 | Resolved: 2023-03-06

## 2023-03-06 PROBLEM — Z00.121 ENCOUNTER FOR ROUTINE CHILD HEALTH EXAMINATION WITH ABNORMAL FINDINGS: Status: RESOLVED | Noted: 2020-02-20 | Resolved: 2023-03-06

## 2023-03-06 PROBLEM — E66.811 CLASS 1 OBESITY DUE TO EXCESS CALORIES WITHOUT SERIOUS COMORBIDITY WITH BODY MASS INDEX (BMI) OF 31.0 TO 31.9 IN ADULT: Status: RESOLVED | Noted: 2022-05-31 | Resolved: 2023-03-06

## 2023-03-06 NOTE — ASSESSMENT & PLAN NOTE
She has been taking Phentermine for the past 7 months for weight loss  She reports success with weight loss using this medication, diet, and exercise  Continue health diet and regular exercise       Follow up in 6 months for wellness exam

## 2023-03-06 NOTE — PROGRESS NOTES
Name: Sissy Ritchie      : 2000      MRN: [id number]  Encounter Provider: Maxx Gil MD  Encounter Date: 3/6/2023   Encounter department: [address] [place] [address]  Weight loss  Assessment & Plan:  She has been taking Phentermine for the past 7 months for weight loss  She reports success with weight loss using this medication, diet, and exercise  Continue health diet and regular exercise  Follow up in 6 months for wellness exam        2  Other depression  Assessment & Plan:  Well controlled without medication  3  Other hyperlipidemia  Assessment & Plan:  Discussed about low-fat diet and regular exercise  We will continue to monitor  4  BMI 25 0-25 9,adult           Subjective     Sierra Manuel is a 21year old female with PMH of depression presenting for follow up for medication check  She started taking Phentermine 7 months ago for weight loss  She has been able to lose weight with the medication along with healthy diet and regular exercise  She has weaned off of the medication as directed over the last month  She denies any side effects such as hypertension of palpitations  She is happy to maintain her diet and exercise without taking Phentermine  Depression  Pertinent negatives include no chest pain, chills, fatigue, fever or myalgias  Review of Systems   Constitutional: Negative for activity change, chills, fatigue and fever  HENT: Negative for facial swelling  Respiratory: Negative for chest tightness and shortness of breath  Cardiovascular: Negative for chest pain and palpitations  Musculoskeletal: Negative for myalgias  Psychiatric/Behavioral: Positive for depression  Negative for agitation         Past Medical History:   Diagnosis Date   • History of placement of ear tubes      Past Surgical History:   Procedure Laterality Date   • TYMPANOSTOMY TUBE PLACEMENT       Family History   Problem Relation Age of Onset   • Hypertension Maternal Grandmother    • COPD Maternal Grandmother    • Breast cancer Maternal Grandfather      Social History     Socioeconomic History   • Marital status: Single     Spouse name: None   • Number of children: None   • Years of education: None   • Highest education level: None   Occupational History   • None   Tobacco Use   • Smoking status: Never   • Smokeless tobacco: Never   Vaping Use   • Vaping Use: Never used   Substance and Sexual Activity   • Alcohol use: No   • Drug use: No   • Sexual activity: Yes     Partners: Male   Other Topics Concern   • None   Social History Narrative   • None     Social Determinants of Health     Financial Resource Strain: Not on file   Food Insecurity: Not on file   Transportation Needs: Not on file   Physical Activity: Not on file   Stress: Not on file   Social Connections: Not on file   Intimate Partner Violence: Not on file   Housing Stability: Not on file     Current Outpatient Medications on File Prior to Visit   Medication Sig   • norethindrone (MICRONOR) 0 35 MG tablet Take 0 35 mg by mouth daily   • triamcinolone (KENALOG) 0 1 % ointment APPLY TOPICALLY 2 (TWO) TIMES DAILY   • clotrimazole-betamethasone (LOTRISONE) 1-0 05 % cream APPLY TO AFFECTED AREA TWICE A DAY (Patient not taking: Reported on 2/7/2023)   • [DISCONTINUED] ibuprofen (MOTRIN) 600 mg tablet 1 tablet 3 (three) times a day TAKEN AS NEEDED (Patient not taking: Reported on 2/7/2023)   • [DISCONTINUED] methocarbamol (ROBAXIN) 500 mg tablet Take 1 tablet (500 mg total) by mouth 2 (two) times a day as needed for muscle spasms (Patient not taking: Reported on 10/14/2021)   • [DISCONTINUED] phentermine 37 5 MG capsule Take 1 capsule (37 5 mg total) by mouth every morning (Patient not taking: Reported on 2/7/2023)     Allergies   Allergen Reactions   • Sulfisoxazole      Immunization History   Administered Date(s) Administered   • COVID-19 PFIZER VACCINE 0 3 ML IM 12/29/2020, 01/19/2021, 09/29/2021   • DTaP 2000, 2000, 2000, 05/16/2001, 02/23/2005   • HPV 08/08/2013, 12/20/2013   • HPV Quadrivalent 05/29/2013   • Hep A, ped/adol, 2 dose 10/31/2008   • Hep A, ped/adol, 3 dose 09/22/2007   • Hep B, Adolescent or Pediatric 2000, 02/12/2001, 05/23/2001   • INFLUENZA 12/29/2014, 10/28/2015, 10/19/2018, 10/12/2020   • IPV 2000, 2000, 2000, 02/23/2005   • Influenza, injectable, quadrivalent, preservative free 0 5 mL 11/08/2019   • MMR 02/12/2001, 02/17/2004   • Meningococcal Conjugate (MCV4O) 05/24/2012   • Meningococcal MCV4P 04/25/2017   • Pneumococcal Conjugate 13-Valent 2000, 2000, 2000, 05/16/2001   • Tdap 05/24/2012   • Varicella 02/12/2001, 09/22/2007       Objective     /70 (BP Location: Left arm, Patient Position: Sitting, Cuff Size: Adult)   Pulse (!) 109   Temp 98 1 °F (36 7 °C) (Tympanic)   Resp 16   Ht 5' 6" (1 676 m)   Wt 71 7 kg (158 lb)   SpO2 98%   BMI 25 50 kg/m²     Physical Exam  Vitals and nursing note reviewed  Constitutional:       Appearance: Normal appearance  She is normal weight  HENT:      Head: Normocephalic and atraumatic  Right Ear: Tympanic membrane normal       Left Ear: Tympanic membrane normal       Mouth/Throat:      Mouth: Mucous membranes are dry  Eyes:      Pupils: Pupils are equal, round, and reactive to light  Cardiovascular:      Rate and Rhythm: Normal rate and regular rhythm  Pulses: Normal pulses  Heart sounds: Normal heart sounds  Pulmonary:      Effort: Pulmonary effort is normal       Breath sounds: Normal breath sounds  Skin:     General: Skin is warm and dry  Capillary Refill: Capillary refill takes less than 2 seconds  Neurological:      General: No focal deficit present  Mental Status: She is alert  Psychiatric:         Mood and Affect: Mood normal        Yady Saez MD BMI Counseling: Body mass index is 25 5 kg/m²   The BMI is above normal  Nutrition recommendations include reducing portion sizes, decreasing overall calorie intake and 3-5 servings of fruits/vegetables daily  Exercise recommendations include moderate aerobic physical activity for 150 minutes/week

## 2023-05-11 ENCOUNTER — OFFICE VISIT (OUTPATIENT)
Dept: URGENT CARE | Age: 23
End: 2023-05-11

## 2023-05-11 VITALS
SYSTOLIC BLOOD PRESSURE: 126 MMHG | TEMPERATURE: 97.1 F | OXYGEN SATURATION: 98 % | RESPIRATION RATE: 18 BRPM | HEART RATE: 78 BPM | DIASTOLIC BLOOD PRESSURE: 74 MMHG

## 2023-05-11 DIAGNOSIS — J02.9 SORE THROAT: Primary | ICD-10-CM

## 2023-05-11 DIAGNOSIS — J02.9 VIRAL PHARYNGITIS: ICD-10-CM

## 2023-05-11 LAB — S PYO AG THROAT QL: NEGATIVE

## 2023-05-11 NOTE — LETTER
May 11, 2023     Patient: Marry Merrill  YOB: 2000  Date of Visit: 5/11/2023      To Whom it May Concern:    Anthony Mendoza is under my professional care  Christian Marroquin was seen in my office on 5/11/2023  Christian Marroquin may return to work today 05/11/2023 as long as symptoms improve, if not please excuse till next working day            Sincerely,          JAVIER Singleton      CC: No Recipients

## 2023-05-11 NOTE — PROGRESS NOTES
NAME: Vincent Swift is a 21 y o  female  : 2000    MRN: [id number]    /74   Pulse 78   Temp (!) 97 1 °F (36 2 °C)   Resp 18   SpO2 98%     8:39 AM    Assessment and Plan   Sore throat [J02 9]  1  Sore throat  POCT rapid strepA    Throat culture      2  Viral pharyngitis  Throat culture          Ramonita Dubin was seen today for sore throat  Diagnoses and all orders for this visit:    Sore throat  -     POCT rapid strepA  -     Throat culture    Viral pharyngitis  -     Throat culture    rapid strep is negative sent for a culture    Patient Instructions   Patient Instructions   Take zyrtec or allegra daily  Use flonase 1-2 sprays in each nare daily   Use nasal saline to the nose,   Use humidifer in room  Symptoms worsen go to ER  Rest      No bacterial infection noted at this time  Rapid strep was negative, sent for culture       Proceed to the nearest ER if symptoms worsen, Follow up with your PCP  Continue to social distance, wash your hands, and wear your masks  Please continue to follow the CDC  gov guidelines daily for they are subject to change on COVID-19    Chief Complaint     Chief Complaint   Patient presents with   • Sore Throat     Two days ago started sore throat  Pain has started to increase  History of Present Illness     20 yo F here today for sore throat for 2 days, pain worsening  No issues breathing or swallowing  Denies fevers  Patient also has a headache  She took a COVID test prior to arrival and was negative  Patient came in to see if she had strep throat due to her throat pain  She has had postnasal drip and has not taken anything over-the-counter  Review of Systems   Review of Systems   Constitutional: Positive for fatigue  Negative for fever  HENT: Positive for congestion, postnasal drip and sore throat  Negative for ear pain, rhinorrhea, sinus pressure and sinus pain  Respiratory: Negative  Cardiovascular: Negative      Gastrointestinal: Negative  Genitourinary: Negative  Neurological: Positive for headaches  Negative for dizziness  Current Medications       Current Outpatient Medications:   •  etonogestrel (NEXPLANON) subdermal implant, Inject under the skin, Disp: , Rfl:   •  clotrimazole-betamethasone (LOTRISONE) 1-0 05 % cream, APPLY TO AFFECTED AREA TWICE A DAY (Patient not taking: Reported on 2/7/2023), Disp: 30 g, Rfl: 0  •  norethindrone (MICRONOR) 0 35 MG tablet, Take 0 35 mg by mouth daily, Disp: , Rfl:   •  triamcinolone (KENALOG) 0 1 % ointment, APPLY TOPICALLY 2 (TWO) TIMES DAILY (Patient not taking: Reported on 5/11/2023), Disp: , Rfl:   •  triamcinolone (KENALOG) 0 1 % ointment, Apply 1 application  topically 2 (two) times a day (Patient not taking: Reported on 5/11/2023), Disp: , Rfl:     Current Allergies     Allergies as of 05/11/2023 - Reviewed 05/11/2023   Allergen Reaction Noted   • Sulfisoxazole  05/22/2018              Past Medical History:   Diagnosis Date   • History of placement of ear tubes        Past Surgical History:   Procedure Laterality Date   • TYMPANOSTOMY TUBE PLACEMENT         Family History   Problem Relation Age of Onset   • Hypertension Maternal Grandmother    • COPD Maternal Grandmother    • Breast cancer Maternal Grandfather          Medications have been verified  The following portions of the patient's history were reviewed and updated as appropriate: allergies, current medications, past family history, past medical history, past social history, past surgical history and problem list     Objective   /74   Pulse 78   Temp (!) 97 1 °F (36 2 °C)   Resp 18   SpO2 98%      Physical Exam     Physical Exam  Constitutional:       General: She is awake  Appearance: Normal appearance  She is well-developed  HENT:      Head: Normocephalic        Right Ear: Hearing, tympanic membrane, ear canal and external ear normal       Left Ear: Hearing, tympanic membrane, ear canal and external "ear normal       Nose: Congestion present  No mucosal edema or rhinorrhea  Right Turbinates: Swollen  Left Turbinates: Not swollen  Right Sinus: No maxillary sinus tenderness  Left Sinus: No maxillary sinus tenderness  Mouth/Throat:      Lips: Pink  Mouth: Mucous membranes are moist       Pharynx: Uvula midline  No pharyngeal swelling, oropharyngeal exudate or posterior oropharyngeal erythema  Tonsils: No tonsillar exudate  Comments: Clear mucus noted in the back of the throat, no erythema or white exudate present  Cardiovascular:      Rate and Rhythm: Normal rate and regular rhythm  Heart sounds: Normal heart sounds  Pulmonary:      Effort: Pulmonary effort is normal       Breath sounds: Normal breath sounds and air entry  No decreased breath sounds, wheezing, rhonchi or rales  Skin:     General: Skin is warm  Capillary Refill: Capillary refill takes less than 2 seconds  Neurological:      General: No focal deficit present  Mental Status: She is alert and oriented to person, place, and time  Psychiatric:         Attention and Perception: Attention normal          Mood and Affect: Mood normal          Behavior: Behavior is cooperative  Note: Portions of this record may have been created with voice recognition software  Occasional wrong word or \"sound a like\" substitutions may have occurred due to the inherent limitations of voice recognition software  Please read the chart carefully and recognize, using context, where substitutions have occurred  JAVIER Lozada  "

## 2023-05-11 NOTE — PATIENT INSTRUCTIONS
Take zyrtec or allegra daily  Use flonase 1-2 sprays in each nare daily   Use nasal saline to the nose,   Use humidifer in room  Symptoms worsen go to ER  Rest      No bacterial infection noted at this time  Rapid strep was negative, sent for culture

## 2023-05-13 LAB — BACTERIA THROAT CULT: NORMAL

## 2024-02-21 PROBLEM — J02.9 PHARYNGITIS WITH VIRAL SYNDROME: Status: RESOLVED | Noted: 2019-06-24 | Resolved: 2024-02-21

## 2024-02-21 PROBLEM — B34.9 PHARYNGITIS WITH VIRAL SYNDROME: Status: RESOLVED | Noted: 2019-06-24 | Resolved: 2024-02-21

## 2024-02-22 ENCOUNTER — OFFICE VISIT (OUTPATIENT)
Dept: FAMILY MEDICINE CLINIC | Facility: CLINIC | Age: 24
End: 2024-02-22
Payer: COMMERCIAL

## 2024-02-22 VITALS
WEIGHT: 174 LBS | DIASTOLIC BLOOD PRESSURE: 84 MMHG | SYSTOLIC BLOOD PRESSURE: 126 MMHG | RESPIRATION RATE: 16 BRPM | HEART RATE: 107 BPM | BODY MASS INDEX: 27.97 KG/M2 | HEIGHT: 66 IN | OXYGEN SATURATION: 99 % | TEMPERATURE: 97.4 F

## 2024-02-22 DIAGNOSIS — L30.8 OTHER ECZEMA: ICD-10-CM

## 2024-02-22 DIAGNOSIS — F32.89 OTHER DEPRESSION: ICD-10-CM

## 2024-02-22 DIAGNOSIS — F84.5 ASPERGER'S DISORDER: ICD-10-CM

## 2024-02-22 DIAGNOSIS — K21.9 GASTROESOPHAGEAL REFLUX DISEASE, UNSPECIFIED WHETHER ESOPHAGITIS PRESENT: Primary | ICD-10-CM

## 2024-02-22 PROBLEM — L30.9 ECZEMA: Status: ACTIVE | Noted: 2024-02-22

## 2024-02-22 PROCEDURE — 99214 OFFICE O/P EST MOD 30 MIN: CPT | Performed by: NURSE PRACTITIONER

## 2024-02-22 RX ORDER — TRIAMCINOLONE ACETONIDE 5 MG/G
CREAM TOPICAL 3 TIMES DAILY
Qty: 15 G | Refills: 0 | Status: SHIPPED | OUTPATIENT
Start: 2024-02-22

## 2024-02-22 RX ORDER — PANTOPRAZOLE SODIUM 20 MG/1
20 TABLET, DELAYED RELEASE ORAL
Qty: 90 TABLET | Refills: 0 | Status: SHIPPED | OUTPATIENT
Start: 2024-02-22 | End: 2024-05-22

## 2024-02-22 NOTE — PROGRESS NOTES
Name: Deysi Merrill      : 2000      MRN: [id number]  Encounter Provider: JAVIER Floyd  Encounter Date: 2024   Encounter department: [facility]    Assessment & Plan     1. Gastroesophageal reflux disease, unspecified whether esophagitis present  Assessment & Plan:  - Not well controlled.  - Will start patient on pantoprazole 20 mg daily before breakfast.   - Avoid triggering food and beverage.  - Remain upright at least 60 minutes after eating.   - Recommend follow up in 6 weeks.     Orders:  -     pantoprazole (PROTONIX) 20 mg tablet; Take 1 tablet (20 mg total) by mouth daily before breakfast    2. Other eczema  Assessment & Plan:  - Prescription sent for triamcinolone cream.     Orders:  -     triamcinolone (KENALOG) 0.5 % cream; Apply topically 3 (three) times a day    3. Asperger's disorder  Assessment & Plan:  - Stable.  - Will continue to monitor.       4. Other depression  Assessment & Plan:  - Well controlled without medication.   - Will continue to monitor.            Subjective     Patient presents to office today with complaints of acid reflux symptoms. States that for the past week has been waking up with a burning sensation in her chest. Sensation lasts all day. Is worse after eating. Has tried OTC Tums with no significant improvement. Also complains of eczema rash on bilateral hands. She has been applying Neosporin. She denies any other concerns or complaints today.           Review of Systems   Constitutional:  Negative for fatigue and fever.   HENT:  Negative for trouble swallowing.    Eyes:  Negative for visual disturbance.   Respiratory:  Negative for cough and shortness of breath.    Cardiovascular:  Negative for chest pain and palpitations.   Gastrointestinal:  Negative for abdominal pain and blood in stool.        GERD     Endocrine: Negative for cold intolerance and heat intolerance.   Genitourinary:  Negative for difficulty urinating  and dysuria.   Musculoskeletal:  Negative for gait problem.   Skin:  Positive for rash.   Neurological:  Negative for dizziness, syncope and headaches.   Hematological:  Negative for adenopathy.   Psychiatric/Behavioral:  Negative for behavioral problems.        Past Medical History:   Diagnosis Date   • History of placement of ear tubes      Past Surgical History:   Procedure Laterality Date   • TYMPANOSTOMY TUBE PLACEMENT       Family History   Problem Relation Age of Onset   • Hypertension Maternal Grandmother    • COPD Maternal Grandmother    • Breast cancer Maternal Grandfather      Social History     Socioeconomic History   • Marital status: Single     Spouse name: None   • Number of children: None   • Years of education: None   • Highest education level: None   Occupational History   • None   Tobacco Use   • Smoking status: Never   • Smokeless tobacco: Never   Vaping Use   • Vaping status: Never Used   Substance and Sexual Activity   • Alcohol use: Yes   • Drug use: No   • Sexual activity: Yes     Partners: Male   Other Topics Concern   • None   Social History Narrative   • None     Social Determinants of Health     Financial Resource Strain: Not on file   Food Insecurity: Not on file   Transportation Needs: Not on file   Physical Activity: Not on file   Stress: Not on file   Social Connections: Not on file   Intimate Partner Violence: Not on file   Housing Stability: Not on file     Current Outpatient Medications on File Prior to Visit   Medication Sig   • etonogestrel (NEXPLANON) subdermal implant Inject under the skin   • clotrimazole-betamethasone (LOTRISONE) 1-0.05 % cream APPLY TO AFFECTED AREA TWICE A DAY   • norethindrone (MICRONOR) 0.35 MG tablet Take 0.35 mg by mouth daily   • triamcinolone (KENALOG) 0.1 % ointment APPLY TOPICALLY 2 (TWO) TIMES DAILY (Patient not taking: Reported on 5/11/2023)   • triamcinolone (KENALOG) 0.1 % ointment Apply 1 application. topically 2 (two) times a day     Allergies  "  Allergen Reactions   • Sulfisoxazole      Immunization History   Administered Date(s) Administered   • COVID-19 PFIZER VACCINE 0.3 ML IM 12/29/2020, 01/19/2021, 09/29/2021   • COVID-19 Pfizer Vac BIVALENT Jonny-sucrose 12 Yr+ IM 10/07/2022   • DTaP 2000, 2000, 2000, 05/16/2001, 02/23/2005   • HPV 08/08/2013, 12/20/2013   • HPV Quadrivalent 05/29/2013   • Hep A, ped/adol, 2 dose 10/31/2008   • Hep A, ped/adol, 3 dose 09/22/2007   • Hep B, Adolescent or Pediatric 2000, 02/12/2001, 05/23/2001   • INFLUENZA 12/29/2014, 10/28/2015, 10/19/2018, 10/12/2020   • IPV 2000, 2000, 2000, 02/23/2005   • Influenza, injectable, quadrivalent, preservative free 0.5 mL 11/08/2019   • MMR 02/12/2001, 02/17/2004   • Meningococcal Conjugate (MCV4O) 05/24/2012   • Meningococcal MCV4, Unspecified 04/25/2017   • Meningococcal MCV4P 04/25/2017   • Pneumococcal Conjugate 13-Valent 2000, 2000, 2000, 05/16/2001   • Tdap 05/24/2012   • Varicella 02/12/2001, 09/22/2007       Objective     /84 (BP Location: Left arm, Patient Position: Sitting, Cuff Size: Adult)   Pulse (!) 107   Temp (!) 97.4 °F (36.3 °C) (Tympanic)   Resp 16   Ht 5' 6\" (1.676 m)   Wt 78.9 kg (174 lb)   SpO2 99%   BMI 28.08 kg/m²     Physical Exam  Vitals and nursing note reviewed.   Constitutional:       General: She is not in acute distress.     Appearance: Normal appearance. She is not ill-appearing.   HENT:      Head: Normocephalic and atraumatic.      Right Ear: External ear normal.      Left Ear: External ear normal.   Eyes:      Conjunctiva/sclera: Conjunctivae normal.   Cardiovascular:      Rate and Rhythm: Normal rate and regular rhythm.      Heart sounds: Normal heart sounds.   Pulmonary:      Effort: Pulmonary effort is normal.      Breath sounds: Normal breath sounds.   Musculoskeletal:         General: Normal range of motion.      Cervical back: Normal range of motion.   Skin:     General: Skin " is warm and dry.   Neurological:      Mental Status: She is alert and oriented to person, place, and time.   Psychiatric:         Mood and Affect: Mood normal.         Behavior: Behavior normal.       JAVIER Floyd

## 2024-02-22 NOTE — ASSESSMENT & PLAN NOTE
- Not well controlled.  - Will start patient on pantoprazole 20 mg daily before breakfast.   - Avoid triggering food and beverage.  - Remain upright at least 60 minutes after eating.   - Recommend follow up in 6 weeks.

## 2024-04-10 ENCOUNTER — OFFICE VISIT (OUTPATIENT)
Dept: FAMILY MEDICINE CLINIC | Facility: CLINIC | Age: 24
End: 2024-04-10

## 2024-04-10 VITALS
SYSTOLIC BLOOD PRESSURE: 112 MMHG | HEIGHT: 66 IN | OXYGEN SATURATION: 98 % | WEIGHT: 173.6 LBS | BODY MASS INDEX: 27.9 KG/M2 | TEMPERATURE: 98.5 F | DIASTOLIC BLOOD PRESSURE: 68 MMHG | HEART RATE: 87 BPM | RESPIRATION RATE: 16 BRPM

## 2024-04-10 DIAGNOSIS — E66.3 OVERWEIGHT WITH BODY MASS INDEX (BMI) OF 28 TO 28.9 IN ADULT: ICD-10-CM

## 2024-04-10 DIAGNOSIS — Z00.00 HEALTHCARE MAINTENANCE: ICD-10-CM

## 2024-04-10 DIAGNOSIS — K21.9 GASTROESOPHAGEAL REFLUX DISEASE, UNSPECIFIED WHETHER ESOPHAGITIS PRESENT: Primary | ICD-10-CM

## 2024-04-10 RX ORDER — PHENTERMINE HYDROCHLORIDE 37.5 MG/1
37.5 TABLET ORAL DAILY
Qty: 30 TABLET | Refills: 0 | Status: SHIPPED | OUTPATIENT
Start: 2024-04-10

## 2024-04-10 NOTE — ASSESSMENT & PLAN NOTE
- Reviewed immunizations and screenings.   - Discussed regular dental, vision, and GYN exams.   - Routine labs ordered.

## 2024-04-10 NOTE — PROGRESS NOTES
Name: Deysi Merrill      : 2000      MRN: [id number]  Encounter Provider: JAVIER Floyd  Encounter Date: 4/10/2024   Encounter department: [facility]    Assessment & Plan     1. Gastroesophageal reflux disease, unspecified whether esophagitis present  Assessment & Plan:  - Improving.   - Continue pantoprazole 20 mg daily.   - Avoid triggering food and beverage.  - Will continue to monitor.       2. Overweight with body mass index (BMI) of 28 to 28.9 in adult  Assessment & Plan:  - Prescription sent for phentermine 37.5 mg daily. Discussed side effects.  - Encourage healthy diet and regular exercise.  - Recommend follow up in 1 month.     Orders:  -     phentermine (ADIPEX-P) 37.5 MG tablet; Take 1 tablet (37.5 mg total) by mouth in the morning    3. Healthcare maintenance  Assessment & Plan:  - Reviewed immunizations and screenings.   - Discussed regular dental, vision, and GYN exams.   - Routine labs ordered.     Orders:  -     CBC and differential; Future  -     Comprehensive metabolic panel; Future  -     Lipid Panel with Direct LDL reflex; Future  -     TSH, 3rd generation with Free T4 reflex; Future         Subjective     Patient presents to office today for follow up of GERD. She was started on pantoprazole 20 mg daily. She states her symptoms have been improving. She has identified foods that make her symptoms worse and is trying to avoid those foods. She is interested in losing weight. Was on phentermine in the past with some success and would like to try that again. She is also due for updated blood work. She denies any other concerns or complaints today.        F/u gerd - started on pantoprazole 20 mg.   Annual - dentist, eye, gyn, labs    Dentist- yes  Eye - yes  Gyn - yes          Review of Systems   Constitutional:  Negative for fatigue and fever.   HENT:  Negative for congestion, postnasal drip and trouble swallowing.    Eyes:  Negative for visual  disturbance.   Respiratory:  Negative for cough and shortness of breath.    Cardiovascular:  Negative for chest pain and palpitations.   Gastrointestinal:  Negative for abdominal pain and blood in stool.   Endocrine: Negative for cold intolerance and heat intolerance.   Genitourinary:  Negative for difficulty urinating, dysuria and frequency.   Musculoskeletal:  Negative for gait problem.   Skin:  Negative for rash.   Neurological:  Negative for dizziness, syncope and headaches.   Hematological:  Negative for adenopathy.   Psychiatric/Behavioral:  Negative for behavioral problems.        Past Medical History:   Diagnosis Date   • History of placement of ear tubes      Past Surgical History:   Procedure Laterality Date   • TYMPANOSTOMY TUBE PLACEMENT       Family History   Problem Relation Age of Onset   • Hypertension Maternal Grandmother    • COPD Maternal Grandmother    • Breast cancer Maternal Grandfather      Social History     Socioeconomic History   • Marital status: Single     Spouse name: None   • Number of children: None   • Years of education: None   • Highest education level: None   Occupational History   • None   Tobacco Use   • Smoking status: Never     Passive exposure: Never   • Smokeless tobacco: Never   Vaping Use   • Vaping status: Never Used   Substance and Sexual Activity   • Alcohol use: Yes   • Drug use: No   • Sexual activity: Yes     Partners: Male   Other Topics Concern   • None   Social History Narrative   • None     Social Determinants of Health     Financial Resource Strain: Not on file   Food Insecurity: Not on file   Transportation Needs: Not on file   Physical Activity: Not on file   Stress: Not on file   Social Connections: Not on file   Intimate Partner Violence: Not on file   Housing Stability: Not on file     Current Outpatient Medications on File Prior to Visit   Medication Sig   • clotrimazole-betamethasone (LOTRISONE) 1-0.05 % cream APPLY TO AFFECTED AREA TWICE A DAY   •  "etonogestrel (NEXPLANON) subdermal implant Inject under the skin   • pantoprazole (PROTONIX) 20 mg tablet Take 1 tablet (20 mg total) by mouth daily before breakfast   • triamcinolone (KENALOG) 0.5 % cream Apply topically 3 (three) times a day   • norethindrone (MICRONOR) 0.35 MG tablet Take 0.35 mg by mouth daily   • triamcinolone (KENALOG) 0.1 % ointment APPLY TOPICALLY 2 (TWO) TIMES DAILY (Patient not taking: Reported on 5/11/2023)   • triamcinolone (KENALOG) 0.1 % ointment Apply 1 application. topically 2 (two) times a day     Allergies   Allergen Reactions   • Sulfisoxazole      Immunization History   Administered Date(s) Administered   • COVID-19 PFIZER VACCINE 0.3 ML IM 12/29/2020, 01/19/2021, 09/29/2021   • COVID-19 Pfizer Vac BIVALENT Jonny-sucrose 12 Yr+ IM 10/07/2022   • DTaP 2000, 2000, 2000, 05/16/2001, 02/23/2005   • HPV 08/08/2013, 12/20/2013   • HPV Quadrivalent 05/29/2013   • Hep A, ped/adol, 2 dose 10/31/2008   • Hep A, ped/adol, 3 dose 09/22/2007   • Hep B, Adolescent or Pediatric 2000, 02/12/2001, 05/23/2001   • INFLUENZA 12/29/2014, 10/28/2015, 10/19/2018, 10/12/2020   • IPV 2000, 2000, 2000, 02/23/2005   • Influenza, injectable, quadrivalent, preservative free 0.5 mL 11/08/2019   • MMR 02/12/2001, 02/17/2004   • Meningococcal Conjugate (MCV4O) 05/24/2012   • Meningococcal MCV4, Unspecified 04/25/2017   • Meningococcal MCV4P 04/25/2017   • Pneumococcal Conjugate 13-Valent 2000, 2000, 2000, 05/16/2001   • Tdap 05/24/2012   • Varicella 02/12/2001, 09/22/2007       Objective     /68 (BP Location: Left arm, Patient Position: Sitting, Cuff Size: Large)   Pulse 87   Temp 98.5 °F (36.9 °C) (Tympanic)   Resp 16   Ht 5' 6\" (1.676 m)   Wt 78.7 kg (173 lb 9.6 oz)   SpO2 98%   BMI 28.02 kg/m²     Physical Exam  Vitals and nursing note reviewed.   Constitutional:       General: She is not in acute distress.     Appearance: Normal " appearance. She is not ill-appearing.   HENT:      Head: Normocephalic and atraumatic.      Right Ear: Tympanic membrane, ear canal and external ear normal.      Left Ear: Tympanic membrane, ear canal and external ear normal.      Nose: Nose normal.      Mouth/Throat:      Mouth: Mucous membranes are moist.   Eyes:      Conjunctiva/sclera: Conjunctivae normal.      Pupils: Pupils are equal, round, and reactive to light.   Cardiovascular:      Rate and Rhythm: Normal rate and regular rhythm.      Heart sounds: Normal heart sounds.   Pulmonary:      Effort: Pulmonary effort is normal.      Breath sounds: Normal breath sounds.   Abdominal:      General: Bowel sounds are normal.      Palpations: Abdomen is soft.   Musculoskeletal:         General: Normal range of motion.      Cervical back: Normal range of motion.   Lymphadenopathy:      Cervical: No cervical adenopathy.   Skin:     General: Skin is warm and dry.   Neurological:      Mental Status: She is alert and oriented to person, place, and time.   Psychiatric:         Mood and Affect: Mood normal.         Behavior: Behavior normal.       JAVIER Floyd

## 2024-04-10 NOTE — ASSESSMENT & PLAN NOTE
- Prescription sent for phentermine 37.5 mg daily. Discussed side effects.  - Encourage healthy diet and regular exercise.  - Recommend follow up in 1 month.

## 2024-05-07 ENCOUNTER — RA CDI HCC (OUTPATIENT)
Dept: OTHER | Facility: HOSPITAL | Age: 24
End: 2024-05-07

## 2024-05-10 PROBLEM — Z00.00 HEALTHCARE MAINTENANCE: Status: RESOLVED | Noted: 2024-04-10 | Resolved: 2024-05-10

## 2024-05-15 ENCOUNTER — OFFICE VISIT (OUTPATIENT)
Dept: FAMILY MEDICINE CLINIC | Facility: CLINIC | Age: 24
End: 2024-05-15
Payer: COMMERCIAL

## 2024-05-15 VITALS
TEMPERATURE: 98.4 F | DIASTOLIC BLOOD PRESSURE: 82 MMHG | HEART RATE: 99 BPM | SYSTOLIC BLOOD PRESSURE: 126 MMHG | HEIGHT: 66 IN | OXYGEN SATURATION: 99 % | RESPIRATION RATE: 16 BRPM | WEIGHT: 167 LBS | BODY MASS INDEX: 26.84 KG/M2

## 2024-05-15 DIAGNOSIS — E66.3 OVERWEIGHT WITH BODY MASS INDEX (BMI) OF 26 TO 26.9 IN ADULT: Primary | ICD-10-CM

## 2024-05-15 PROCEDURE — 99213 OFFICE O/P EST LOW 20 MIN: CPT | Performed by: NURSE PRACTITIONER

## 2024-05-15 RX ORDER — PHENTERMINE HYDROCHLORIDE 37.5 MG/1
37.5 TABLET ORAL DAILY
Qty: 30 TABLET | Refills: 0 | Status: SHIPPED | OUTPATIENT
Start: 2024-05-15

## 2024-05-15 NOTE — ASSESSMENT & PLAN NOTE
- Down 6 pounds since last visit. Continue phentermine 37.5 mg daily. Discussed side effects.  - Encourage healthy diet and regular exercise.  - Recommend follow up in 1 month.

## 2024-05-15 NOTE — PROGRESS NOTES
Ambulatory Visit  Name: Deysi Merrill      : 2000      MRN: [id number]  Encounter Provider: JAVIER Floyd  Encounter Date: 5/15/2024   Encounter department: [facility]    Assessment & Plan   1. Overweight with body mass index (BMI) of 26 to 26.9 in adult  Assessment & Plan:  - Down 6 pounds since last visit. Continue phentermine 37.5 mg daily. Discussed side effects.  - Encourage healthy diet and regular exercise.  - Recommend follow up in 1 month.   Orders:  -     phentermine (ADIPEX-P) 37.5 MG tablet; Take 1 tablet (37.5 mg total) by mouth in the morning         History of Present Illness   {Disappearing Hyperlinks I Encounters * My Last Note * Since Last Visit * History :57397}  Patient presents to office today for 1 month follow up. She is currently on phentermine for weight management. She is down 6 pounds since last visit. She did have trouble sleeping at first when she started the medication but that has improved. She denies any other side effects. Denies any other significant complaints today.         Review of Systems   Constitutional:  Negative for fatigue and fever.   HENT:  Negative for trouble swallowing.    Eyes:  Negative for visual disturbance.   Respiratory:  Negative for cough and shortness of breath.    Cardiovascular:  Negative for chest pain and palpitations.   Gastrointestinal:  Negative for abdominal pain and blood in stool.   Endocrine: Negative for cold intolerance and heat intolerance.   Genitourinary:  Negative for difficulty urinating and dysuria.   Musculoskeletal:  Negative for gait problem.   Skin:  Negative for rash.   Neurological:  Negative for dizziness, syncope and headaches.   Hematological:  Negative for adenopathy.   Psychiatric/Behavioral:  Negative for behavioral problems.      Past Medical History:   Diagnosis Date   • History of placement of ear tubes      Past Surgical History:   Procedure Laterality Date   • TYMPANOSTOMY  TUBE PLACEMENT       Family History   Problem Relation Age of Onset   • Hypertension Maternal Grandmother    • COPD Maternal Grandmother    • Breast cancer Maternal Grandfather      Social History     Tobacco Use   • Smoking status: Never     Passive exposure: Never   • Smokeless tobacco: Never   Vaping Use   • Vaping status: Never Used   Substance and Sexual Activity   • Alcohol use: Yes   • Drug use: No   • Sexual activity: Yes     Partners: Male     Current Outpatient Medications on File Prior to Visit   Medication Sig   • clotrimazole-betamethasone (LOTRISONE) 1-0.05 % cream APPLY TO AFFECTED AREA TWICE A DAY   • etonogestrel (NEXPLANON) subdermal implant Inject under the skin   • pantoprazole (PROTONIX) 20 mg tablet Take 1 tablet (20 mg total) by mouth daily before breakfast   • triamcinolone (KENALOG) 0.5 % cream Apply topically 3 (three) times a day   • [DISCONTINUED] phentermine (ADIPEX-P) 37.5 MG tablet Take 1 tablet (37.5 mg total) by mouth in the morning   • norethindrone (MICRONOR) 0.35 MG tablet Take 0.35 mg by mouth daily   • triamcinolone (KENALOG) 0.1 % ointment APPLY TOPICALLY 2 (TWO) TIMES DAILY (Patient not taking: Reported on 5/11/2023)   • triamcinolone (KENALOG) 0.1 % ointment Apply 1 application. topically 2 (two) times a day     Allergies   Allergen Reactions   • Sulfisoxazole      Immunization History   Administered Date(s) Administered   • COVID-19 PFIZER VACCINE 0.3 ML IM 12/29/2020, 01/19/2021, 09/29/2021   • COVID-19 Pfizer Vac BIVALENT Jonny-sucrose 12 Yr+ IM 10/07/2022   • DTaP 2000, 2000, 2000, 05/16/2001, 02/23/2005   • HPV 08/08/2013, 12/20/2013   • HPV Quadrivalent 05/29/2013   • Hep A, ped/adol, 2 dose 10/31/2008   • Hep A, ped/adol, 3 dose 09/22/2007   • Hep B, Adolescent or Pediatric 2000, 02/12/2001, 05/23/2001   • INFLUENZA 12/29/2014, 10/28/2015, 10/19/2018, 10/12/2020   • IPV 2000, 2000, 2000, 02/23/2005   • Influenza, injectable,  "quadrivalent, preservative free 0.5 mL 11/08/2019   • MMR 02/12/2001, 02/17/2004   • Meningococcal Conjugate (MCV4O) 05/24/2012   • Meningococcal MCV4, Unspecified 04/25/2017   • Meningococcal MCV4P 04/25/2017   • Pneumococcal Conjugate 13-Valent 2000, 2000, 2000, 05/16/2001   • Tdap 05/24/2012   • Varicella 02/12/2001, 09/22/2007     Objective   {Disappearing Hyperlinks   Review Vitals * Enter New Vitals * Results Review * Labs * Imaging * Cardiology * Procedures * Lung Cancer Screening :07174}  /82 (BP Location: Left arm, Patient Position: Sitting, Cuff Size: Adult)   Pulse 99   Temp 98.4 °F (36.9 °C) (Tympanic)   Resp 16   Ht 5' 6\" (1.676 m)   Wt 75.8 kg (167 lb)   SpO2 99%   BMI 26.95 kg/m²     Physical Exam  Vitals and nursing note reviewed.   Constitutional:       Appearance: Normal appearance. She is well-developed.   HENT:      Head: Normocephalic and atraumatic.      Right Ear: External ear normal.      Left Ear: External ear normal.   Eyes:      Conjunctiva/sclera: Conjunctivae normal.   Cardiovascular:      Rate and Rhythm: Normal rate and regular rhythm.      Heart sounds: Normal heart sounds.   Pulmonary:      Effort: Pulmonary effort is normal.      Breath sounds: Normal breath sounds.   Musculoskeletal:         General: Normal range of motion.      Cervical back: Normal range of motion.   Skin:     General: Skin is warm and dry.   Neurological:      Mental Status: She is alert and oriented to person, place, and time.   Psychiatric:         Mood and Affect: Mood normal.         Behavior: Behavior normal.       Administrative Statements {Disappearing Hyperlinks I  Level of Service * [id number]}        "

## 2024-05-18 ENCOUNTER — APPOINTMENT (OUTPATIENT)
Dept: LAB | Age: 24
End: 2024-05-18
Payer: COMMERCIAL

## 2024-05-18 DIAGNOSIS — Z00.00 HEALTHCARE MAINTENANCE: ICD-10-CM

## 2024-05-18 LAB
ALBUMIN SERPL BCP-MCNC: 4.5 G/DL (ref 3.5–5)
ALP SERPL-CCNC: 47 U/L (ref 34–104)
ALT SERPL W P-5'-P-CCNC: 12 U/L (ref 7–52)
ANION GAP SERPL CALCULATED.3IONS-SCNC: 6 MMOL/L (ref 4–13)
AST SERPL W P-5'-P-CCNC: 18 U/L (ref 13–39)
BASOPHILS # BLD AUTO: 0.05 THOUSANDS/ÂΜL (ref 0–0.1)
BASOPHILS NFR BLD AUTO: 1 % (ref 0–1)
BILIRUB SERPL-MCNC: 0.73 MG/DL (ref 0.2–1)
BUN SERPL-MCNC: 9 MG/DL (ref 5–25)
CALCIUM SERPL-MCNC: 9.6 MG/DL (ref 8.4–10.2)
CHLORIDE SERPL-SCNC: 105 MMOL/L (ref 96–108)
CHOLEST SERPL-MCNC: 149 MG/DL
CO2 SERPL-SCNC: 28 MMOL/L (ref 21–32)
CREAT SERPL-MCNC: 0.72 MG/DL (ref 0.6–1.3)
EOSINOPHIL # BLD AUTO: 0.05 THOUSAND/ÂΜL (ref 0–0.61)
EOSINOPHIL NFR BLD AUTO: 1 % (ref 0–6)
ERYTHROCYTE [DISTWIDTH] IN BLOOD BY AUTOMATED COUNT: 11.8 % (ref 11.6–15.1)
GFR SERPL CREATININE-BSD FRML MDRD: 117 ML/MIN/1.73SQ M
GLUCOSE P FAST SERPL-MCNC: 88 MG/DL (ref 65–99)
HCT VFR BLD AUTO: 41.6 % (ref 34.8–46.1)
HDLC SERPL-MCNC: 59 MG/DL
HGB BLD-MCNC: 14 G/DL (ref 11.5–15.4)
IMM GRANULOCYTES # BLD AUTO: 0.03 THOUSAND/UL (ref 0–0.2)
IMM GRANULOCYTES NFR BLD AUTO: 0 % (ref 0–2)
LDLC SERPL CALC-MCNC: 82 MG/DL (ref 0–100)
LYMPHOCYTES # BLD AUTO: 2.13 THOUSANDS/ÂΜL (ref 0.6–4.47)
LYMPHOCYTES NFR BLD AUTO: 29 % (ref 14–44)
MCH RBC QN AUTO: 29.7 PG (ref 26.8–34.3)
MCHC RBC AUTO-ENTMCNC: 33.7 G/DL (ref 31.4–37.4)
MCV RBC AUTO: 88 FL (ref 82–98)
MONOCYTES # BLD AUTO: 0.51 THOUSAND/ÂΜL (ref 0.17–1.22)
MONOCYTES NFR BLD AUTO: 7 % (ref 4–12)
NEUTROPHILS # BLD AUTO: 4.59 THOUSANDS/ÂΜL (ref 1.85–7.62)
NEUTS SEG NFR BLD AUTO: 62 % (ref 43–75)
NRBC BLD AUTO-RTO: 0 /100 WBCS
PLATELET # BLD AUTO: 321 THOUSANDS/UL (ref 149–390)
PMV BLD AUTO: 10.5 FL (ref 8.9–12.7)
POTASSIUM SERPL-SCNC: 4.5 MMOL/L (ref 3.5–5.3)
PROT SERPL-MCNC: 7.3 G/DL (ref 6.4–8.4)
RBC # BLD AUTO: 4.72 MILLION/UL (ref 3.81–5.12)
SODIUM SERPL-SCNC: 139 MMOL/L (ref 135–147)
TRIGL SERPL-MCNC: 38 MG/DL
TSH SERPL DL<=0.05 MIU/L-ACNC: 1.23 UIU/ML (ref 0.45–4.5)
WBC # BLD AUTO: 7.36 THOUSAND/UL (ref 4.31–10.16)

## 2024-05-18 PROCEDURE — 80061 LIPID PANEL: CPT

## 2024-05-18 PROCEDURE — 85025 COMPLETE CBC W/AUTO DIFF WBC: CPT

## 2024-05-18 PROCEDURE — 80053 COMPREHEN METABOLIC PANEL: CPT

## 2024-05-18 PROCEDURE — 84443 ASSAY THYROID STIM HORMONE: CPT

## 2024-05-18 PROCEDURE — 36415 COLL VENOUS BLD VENIPUNCTURE: CPT

## 2024-05-22 ENCOUNTER — TELEPHONE (OUTPATIENT)
Dept: FAMILY MEDICINE CLINIC | Facility: CLINIC | Age: 24
End: 2024-05-22

## 2024-06-02 ENCOUNTER — OFFICE VISIT (OUTPATIENT)
Dept: URGENT CARE | Age: 24
End: 2024-06-02
Payer: COMMERCIAL

## 2024-06-02 VITALS
HEART RATE: 98 BPM | SYSTOLIC BLOOD PRESSURE: 132 MMHG | RESPIRATION RATE: 18 BRPM | OXYGEN SATURATION: 100 % | DIASTOLIC BLOOD PRESSURE: 82 MMHG | TEMPERATURE: 98.2 F

## 2024-06-02 DIAGNOSIS — J02.9 SORE THROAT: Primary | ICD-10-CM

## 2024-06-02 PROCEDURE — S9083 URGENT CARE CENTER GLOBAL: HCPCS | Performed by: EMERGENCY MEDICINE

## 2024-06-02 PROCEDURE — G0382 LEV 3 HOSP TYPE B ED VISIT: HCPCS | Performed by: EMERGENCY MEDICINE

## 2024-06-02 NOTE — PROGRESS NOTES
[facility]        NAME: Deysi Merrill is a 24 y.o. female  : 2000    MRN: [id number]  DATE: 2024  TIME: 4:40 PM    Assessment and Plan   Sore throat [J02.9]  1. Sore throat        Sore throat, congestion, plugged ears. No fevers. Requesting rapid strep. Went to a gas station and the last time she went to that gas station, she got strep. Rapid negative.       Patient Instructions       Follow up with PCP in 3-5 days.  Proceed to  ER if symptoms worsen.    If tests have been performed at [facility], our office will contact you with results if changes need to be made to the care plan discussed with you at the visit.  You can review your full results on [facility].    Chief Complaint     Chief Complaint   Patient presents with    Sore Throat     Sore throat, congestion, cough for 3 days. Sx are better but have not gone away.          History of Present Illness       Sore throat, congestion, plugged ears. No fevers. Requesting rapid strep. Went to a gas station and the last time she went to that gas station, she got strep. Rapid negative.     Sore Throat   This is a new problem. The current episode started yesterday. The problem has been waxing and waning. The pain is worse on the left side. There has been no fever. The fever has been present for Less than 1 day. The pain is at a severity of 3/10. The pain is mild. Associated symptoms include congestion, headaches and a plugged ear sensation. Pertinent negatives include no abdominal pain, coughing, diarrhea, ear discharge, ear pain, hoarse voice, neck pain, shortness of breath, stridor, swollen glands or vomiting.       Review of Systems   Review of Systems   HENT:  Positive for congestion and sore throat. Negative for ear discharge, ear pain and hoarse voice.    Respiratory:  Negative for cough, shortness of breath and stridor.    Gastrointestinal:  Negative for abdominal pain, diarrhea and vomiting.   Musculoskeletal:  Negative  for neck pain.   Neurological:  Positive for headaches.         Current Medications       Current Outpatient Medications:     etonogestrel (NEXPLANON) subdermal implant, Inject under the skin, Disp: , Rfl:     phentermine (ADIPEX-P) 37.5 MG tablet, Take 1 tablet (37.5 mg total) by mouth in the morning, Disp: 30 tablet, Rfl: 0    clotrimazole-betamethasone (LOTRISONE) 1-0.05 % cream, APPLY TO AFFECTED AREA TWICE A DAY (Patient not taking: Reported on 6/2/2024), Disp: 30 g, Rfl: 0    norethindrone (MICRONOR) 0.35 MG tablet, Take 0.35 mg by mouth daily (Patient not taking: Reported on 6/2/2024), Disp: , Rfl:     pantoprazole (PROTONIX) 20 mg tablet, Take 1 tablet (20 mg total) by mouth daily before breakfast, Disp: 90 tablet, Rfl: 0    triamcinolone (KENALOG) 0.1 % ointment, APPLY TOPICALLY 2 (TWO) TIMES DAILY (Patient not taking: Reported on 5/11/2023), Disp: , Rfl:     triamcinolone (KENALOG) 0.1 % ointment, Apply 1 application. topically 2 (two) times a day, Disp: , Rfl:     triamcinolone (KENALOG) 0.5 % cream, Apply topically 3 (three) times a day, Disp: 15 g, Rfl: 0    Current Allergies     Allergies as of 06/02/2024 - Reviewed 06/02/2024   Allergen Reaction Noted    Sulfisoxazole  05/22/2018            The following portions of the patient's history were reviewed and updated as appropriate: allergies, current medications, past family history, past medical history, past social history, past surgical history and problem list.     Past Medical History:   Diagnosis Date    History of placement of ear tubes        Past Surgical History:   Procedure Laterality Date    TYMPANOSTOMY TUBE PLACEMENT         Family History   Problem Relation Age of Onset    Hypertension Maternal Grandmother     COPD Maternal Grandmother     Breast cancer Maternal Grandfather          Medications have been verified.        Objective   /82   Pulse 98   Temp 98.2 °F (36.8 °C) (Tympanic)   Resp 18   SpO2 100%   No LMP recorded.        Physical Exam     Physical Exam  Vitals reviewed.   Constitutional:       Appearance: She is well-developed.   HENT:      Right Ear: Tympanic membrane normal.      Left Ear: Tympanic membrane normal.      Nose: Congestion and rhinorrhea present.      Mouth/Throat:      Tonsils: No tonsillar exudate. 0 on the right. 0 on the left.   Cardiovascular:      Rate and Rhythm: Normal rate and regular rhythm.      Heart sounds: Normal heart sounds.   Pulmonary:      Effort: Pulmonary effort is normal.      Breath sounds: Normal breath sounds.   Lymphadenopathy:      Cervical: No cervical adenopathy.   Neurological:      Mental Status: She is alert.

## 2024-06-12 ENCOUNTER — OFFICE VISIT (OUTPATIENT)
Dept: FAMILY MEDICINE CLINIC | Facility: CLINIC | Age: 24
End: 2024-06-12
Payer: COMMERCIAL

## 2024-06-12 VITALS
RESPIRATION RATE: 16 BRPM | OXYGEN SATURATION: 98 % | TEMPERATURE: 98.5 F | DIASTOLIC BLOOD PRESSURE: 68 MMHG | SYSTOLIC BLOOD PRESSURE: 114 MMHG | BODY MASS INDEX: 26.2 KG/M2 | HEIGHT: 66 IN | HEART RATE: 115 BPM | WEIGHT: 163 LBS

## 2024-06-12 DIAGNOSIS — E66.3 OVERWEIGHT WITH BODY MASS INDEX (BMI) OF 26 TO 26.9 IN ADULT: Primary | ICD-10-CM

## 2024-06-12 PROCEDURE — 99213 OFFICE O/P EST LOW 20 MIN: CPT | Performed by: NURSE PRACTITIONER

## 2024-06-12 RX ORDER — PHENTERMINE HYDROCHLORIDE 37.5 MG/1
37.5 TABLET ORAL DAILY
Qty: 30 TABLET | Refills: 0 | Status: SHIPPED | OUTPATIENT
Start: 2024-06-12

## 2024-06-12 NOTE — ASSESSMENT & PLAN NOTE
- Down 4 pounds since last visit. Continue phentermine 37.5 mg daily. Discussed side effects.  - Encourage healthy diet and regular exercise.  - Recommend follow up in 1 month.

## 2024-06-12 NOTE — PROGRESS NOTES
Ambulatory Visit  Name: Deysi Merrill      : 2000      MRN: [id number]  Encounter Provider: JAVIER Floyd  Encounter Date: 2024   Encounter department: [facility]    Assessment & Plan   1. Overweight with body mass index (BMI) of 26 to 26.9 in adult  Assessment & Plan:  - Down 4 pounds since last visit. Continue phentermine 37.5 mg daily. Discussed side effects.  - Encourage healthy diet and regular exercise.  - Recommend follow up in 1 month.   Orders:  -     phentermine (ADIPEX-P) 37.5 MG tablet; Take 1 tablet (37.5 mg total) by mouth in the morning         History of Present Illness   {Disappearing Hyperlinks I Encounters * My Last Note * Since Last Visit * History :09294}  Patient presents to office today for 1 month follow up for weight management. She is currently taking phentermine. She is down 4 pounds since last visit. Denies any significant side effects. She denies any other concerns or complaints today.   .      Review of Systems   Constitutional:  Negative for fatigue and fever.   HENT:  Negative for trouble swallowing.    Eyes:  Negative for visual disturbance.   Respiratory:  Negative for cough and shortness of breath.    Cardiovascular:  Negative for chest pain and palpitations.   Gastrointestinal:  Negative for abdominal pain and blood in stool.   Endocrine: Negative for cold intolerance and heat intolerance.   Genitourinary:  Negative for difficulty urinating and dysuria.   Musculoskeletal:  Negative for gait problem.   Skin:  Negative for rash.   Neurological:  Negative for dizziness, syncope and headaches.   Hematological:  Negative for adenopathy.   Psychiatric/Behavioral:  Negative for behavioral problems.      Past Medical History:   Diagnosis Date   • History of placement of ear tubes      Past Surgical History:   Procedure Laterality Date   • TYMPANOSTOMY TUBE PLACEMENT       Family History   Problem Relation Age of Onset   •  Hypertension Maternal Grandmother    • COPD Maternal Grandmother    • Breast cancer Maternal Grandfather      Social History     Tobacco Use   • Smoking status: Never     Passive exposure: Never   • Smokeless tobacco: Never   Vaping Use   • Vaping status: Never Used   Substance and Sexual Activity   • Alcohol use: Yes   • Drug use: No   • Sexual activity: Yes     Partners: Male     Current Outpatient Medications on File Prior to Visit   Medication Sig   • etonogestrel (NEXPLANON) subdermal implant Inject under the skin   • [DISCONTINUED] phentermine (ADIPEX-P) 37.5 MG tablet Take 1 tablet (37.5 mg total) by mouth in the morning   • clotrimazole-betamethasone (LOTRISONE) 1-0.05 % cream APPLY TO AFFECTED AREA TWICE A DAY (Patient not taking: Reported on 6/2/2024)   • norethindrone (MICRONOR) 0.35 MG tablet Take 0.35 mg by mouth daily   • pantoprazole (PROTONIX) 20 mg tablet Take 1 tablet (20 mg total) by mouth daily before breakfast   • triamcinolone (KENALOG) 0.1 % ointment    • triamcinolone (KENALOG) 0.1 % ointment Apply 1 application. topically 2 (two) times a day   • triamcinolone (KENALOG) 0.5 % cream Apply topically 3 (three) times a day     Allergies   Allergen Reactions   • Sulfisoxazole      Immunization History   Administered Date(s) Administered   • COVID-19 PFIZER VACCINE 0.3 ML IM 12/29/2020, 01/19/2021, 09/29/2021   • COVID-19 Pfizer Vac BIVALENT Jonny-sucrose 12 Yr+ IM 10/07/2022   • DTaP 2000, 2000, 2000, 05/16/2001, 02/23/2005   • HPV 08/08/2013, 12/20/2013   • HPV Quadrivalent 05/29/2013   • Hep A, ped/adol, 2 dose 10/31/2008   • Hep A, ped/adol, 3 dose 09/22/2007   • Hep B, Adolescent or Pediatric 2000, 02/12/2001, 05/23/2001   • INFLUENZA 12/29/2014, 10/28/2015, 10/19/2018, 10/12/2020   • IPV 2000, 2000, 2000, 02/23/2005   • Influenza, injectable, quadrivalent, preservative free 0.5 mL 11/08/2019   • MMR 02/12/2001, 02/17/2004   • Meningococcal Conjugate  "(MCV4O) 05/24/2012   • Meningococcal MCV4, Unspecified 04/25/2017   • Meningococcal MCV4P 04/25/2017   • Pneumococcal Conjugate 13-Valent 2000, 2000, 2000, 05/16/2001   • Tdap 05/24/2012   • Varicella 02/12/2001, 09/22/2007     Objective   {Disappearing Hyperlinks   Review Vitals * Enter New Vitals * Results Review * Labs * Imaging * Cardiology * Procedures * Lung Cancer Screening :07254}  /68 (BP Location: Left arm, Patient Position: Sitting, Cuff Size: Adult)   Pulse (!) 115   Temp 98.5 °F (36.9 °C) (Tympanic)   Resp 16   Ht 5' 6\" (1.676 m)   Wt 73.9 kg (163 lb)   SpO2 98%   BMI 26.31 kg/m²     Physical Exam  Vitals and nursing note reviewed.   Constitutional:       Appearance: Normal appearance. She is well-developed.   HENT:      Head: Normocephalic and atraumatic.      Right Ear: External ear normal.      Left Ear: External ear normal.   Eyes:      Conjunctiva/sclera: Conjunctivae normal.   Cardiovascular:      Rate and Rhythm: Normal rate and regular rhythm.      Heart sounds: Normal heart sounds.   Pulmonary:      Effort: Pulmonary effort is normal.      Breath sounds: Normal breath sounds.   Musculoskeletal:         General: Normal range of motion.      Cervical back: Normal range of motion.   Skin:     General: Skin is warm and dry.   Neurological:      Mental Status: She is alert and oriented to person, place, and time.   Psychiatric:         Mood and Affect: Mood normal.         Behavior: Behavior normal.       Administrative Statements {Disappearing Hyperlinks I  Level of Service * PCM/PCSP:96150}        "

## 2024-07-17 ENCOUNTER — OFFICE VISIT (OUTPATIENT)
Dept: FAMILY MEDICINE CLINIC | Facility: CLINIC | Age: 24
End: 2024-07-17
Payer: COMMERCIAL

## 2024-07-17 VITALS
HEIGHT: 66 IN | WEIGHT: 158.4 LBS | RESPIRATION RATE: 16 BRPM | OXYGEN SATURATION: 98 % | DIASTOLIC BLOOD PRESSURE: 80 MMHG | TEMPERATURE: 98.7 F | SYSTOLIC BLOOD PRESSURE: 120 MMHG | HEART RATE: 102 BPM | BODY MASS INDEX: 25.46 KG/M2

## 2024-07-17 DIAGNOSIS — E66.3 OVERWEIGHT WITH BODY MASS INDEX (BMI) OF 25 TO 25.9 IN ADULT: Primary | ICD-10-CM

## 2024-07-17 PROCEDURE — 99213 OFFICE O/P EST LOW 20 MIN: CPT | Performed by: NURSE PRACTITIONER

## 2024-07-17 RX ORDER — PHENTERMINE HYDROCHLORIDE 37.5 MG/1
37.5 TABLET ORAL DAILY
Qty: 30 TABLET | Refills: 0 | Status: SHIPPED | OUTPATIENT
Start: 2024-07-17

## 2024-07-17 NOTE — ASSESSMENT & PLAN NOTE
- Down 5 pounds since last visit. Continue phentermine 37.5 mg daily. Discussed side effects.  - Encourage healthy diet and regular exercise.  - Recommend follow up in 1 month.

## 2024-07-17 NOTE — PROGRESS NOTES
Ambulatory Visit  Name: Deysi Merrill      : 2000      MRN: [id number]  Encounter Provider: JAVIER Floyd  Encounter Date: 2024   Encounter department: [facility]    Assessment & Plan   1. Overweight with body mass index (BMI) of 25 to 25.9 in adult  Assessment & Plan:  - Down 5 pounds since last visit. Continue phentermine 37.5 mg daily. Discussed side effects.  - Encourage healthy diet and regular exercise.  - Recommend follow up in 1 month.   Orders:  -     phentermine (ADIPEX-P) 37.5 MG tablet; Take 1 tablet (37.5 mg total) by mouth in the morning         History of Present Illness     Patient presents to office today to follow up. She is taking phentermine for weight loss. She is down 5 pounds since last visit. Denies any side effects from the medication. Denies any other significant concerns or complaints today.          Review of Systems   Constitutional:  Negative for fatigue and fever.   HENT:  Negative for trouble swallowing.    Eyes:  Negative for visual disturbance.   Respiratory:  Negative for cough and shortness of breath.    Cardiovascular:  Negative for chest pain and palpitations.   Gastrointestinal:  Negative for abdominal pain and blood in stool.   Endocrine: Negative for cold intolerance and heat intolerance.   Genitourinary:  Negative for difficulty urinating and dysuria.   Musculoskeletal:  Negative for gait problem.   Skin:  Negative for rash.   Neurological:  Negative for dizziness, syncope and headaches.   Hematological:  Negative for adenopathy.   Psychiatric/Behavioral:  Negative for behavioral problems.      Past Medical History:   Diagnosis Date   • History of placement of ear tubes      Past Surgical History:   Procedure Laterality Date   • TYMPANOSTOMY TUBE PLACEMENT       Family History   Problem Relation Age of Onset   • Hypertension Maternal Grandmother    • COPD Maternal Grandmother    • Breast cancer Maternal Grandfather       Social History     Tobacco Use   • Smoking status: Never     Passive exposure: Never   • Smokeless tobacco: Never   Vaping Use   • Vaping status: Never Used   Substance and Sexual Activity   • Alcohol use: Yes   • Drug use: No   • Sexual activity: Yes     Partners: Male     Current Outpatient Medications on File Prior to Visit   Medication Sig   • etonogestrel (NEXPLANON) subdermal implant Inject under the skin   • [DISCONTINUED] phentermine (ADIPEX-P) 37.5 MG tablet Take 1 tablet (37.5 mg total) by mouth in the morning   • clotrimazole-betamethasone (LOTRISONE) 1-0.05 % cream APPLY TO AFFECTED AREA TWICE A DAY (Patient not taking: Reported on 6/2/2024)   • norethindrone (MICRONOR) 0.35 MG tablet Take 0.35 mg by mouth daily   • pantoprazole (PROTONIX) 20 mg tablet Take 1 tablet (20 mg total) by mouth daily before breakfast   • triamcinolone (KENALOG) 0.1 % ointment    • triamcinolone (KENALOG) 0.1 % ointment Apply 1 application. topically 2 (two) times a day   • triamcinolone (KENALOG) 0.5 % cream Apply topically 3 (three) times a day     Allergies   Allergen Reactions   • Sulfisoxazole      Immunization History   Administered Date(s) Administered   • COVID-19 PFIZER VACCINE 0.3 ML IM 12/29/2020, 01/19/2021, 09/29/2021   • COVID-19 Pfizer Vac BIVALENT Jonny-sucrose 12 Yr+ IM 10/07/2022   • DTaP 2000, 2000, 2000, 05/16/2001, 02/23/2005   • HPV 08/08/2013, 12/20/2013   • HPV Quadrivalent 05/29/2013   • Hep A, ped/adol, 2 dose 10/31/2008   • Hep A, ped/adol, 3 dose 09/22/2007   • Hep B, Adolescent or Pediatric 2000, 02/12/2001, 05/23/2001   • INFLUENZA 12/29/2014, 10/28/2015, 10/19/2018, 10/12/2020   • IPV 2000, 2000, 2000, 02/23/2005   • Influenza, injectable, quadrivalent, preservative free 0.5 mL 11/08/2019   • MMR 02/12/2001, 02/17/2004   • Meningococcal Conjugate (MCV4O) 05/24/2012   • Meningococcal MCV4, Unspecified 04/25/2017   • Meningococcal MCV4P 04/25/2017   •  "Pneumococcal Conjugate 13-Valent 2000, 2000, 2000, 05/16/2001   • Tdap 05/24/2012   • Varicella 02/12/2001, 09/22/2007     Objective     /80 (BP Location: Left arm, Patient Position: Sitting, Cuff Size: Large)   Pulse 102   Temp 98.7 °F (37.1 °C) (Tympanic)   Resp 16   Ht 5' 6\" (1.676 m)   Wt 71.8 kg (158 lb 6.4 oz)   SpO2 98%   BMI 25.57 kg/m²     Physical Exam  Vitals and nursing note reviewed.   Constitutional:       Appearance: Normal appearance. She is well-developed.   HENT:      Head: Normocephalic and atraumatic.      Right Ear: External ear normal.      Left Ear: External ear normal.   Eyes:      Conjunctiva/sclera: Conjunctivae normal.   Cardiovascular:      Rate and Rhythm: Normal rate and regular rhythm.      Heart sounds: Normal heart sounds.   Pulmonary:      Effort: Pulmonary effort is normal.      Breath sounds: Normal breath sounds.   Musculoskeletal:         General: Normal range of motion.      Cervical back: Normal range of motion.   Skin:     General: Skin is warm and dry.   Neurological:      Mental Status: She is alert and oriented to person, place, and time.   Psychiatric:         Mood and Affect: Mood normal.         Behavior: Behavior normal.         "

## 2024-08-21 ENCOUNTER — OFFICE VISIT (OUTPATIENT)
Dept: FAMILY MEDICINE CLINIC | Facility: CLINIC | Age: 24
End: 2024-08-21
Payer: COMMERCIAL

## 2024-08-21 VITALS
HEIGHT: 66 IN | SYSTOLIC BLOOD PRESSURE: 126 MMHG | TEMPERATURE: 98.6 F | WEIGHT: 154.8 LBS | RESPIRATION RATE: 16 BRPM | BODY MASS INDEX: 24.88 KG/M2 | DIASTOLIC BLOOD PRESSURE: 74 MMHG | OXYGEN SATURATION: 95 % | HEART RATE: 118 BPM

## 2024-08-21 DIAGNOSIS — F32.89 OTHER DEPRESSION: ICD-10-CM

## 2024-08-21 PROCEDURE — 99213 OFFICE O/P EST LOW 20 MIN: CPT | Performed by: NURSE PRACTITIONER

## 2024-08-21 RX ORDER — METRONIDAZOLE 500 MG/1
500 TABLET ORAL EVERY 8 HOURS SCHEDULED
COMMUNITY

## 2024-08-21 RX ORDER — NORETHINDRONE ACETATE 5 MG
10 TABLET ORAL DAILY
COMMUNITY
Start: 2024-07-30

## 2024-08-21 RX ORDER — PHENTERMINE HYDROCHLORIDE 37.5 MG/1
37.5 TABLET ORAL DAILY
Qty: 30 TABLET | Refills: 0 | Status: SHIPPED | OUTPATIENT
Start: 2024-08-21

## 2024-08-21 NOTE — ASSESSMENT & PLAN NOTE
- Down 4 pounds since last visit.  - Continue phentermine 37.5 mg daily. Discussed side effects.  - Encourage healthy diet and regular exercise.  - Recommend follow up in 1 month.

## 2024-08-21 NOTE — PROGRESS NOTES
Ambulatory Visit  Name: Deysi Merrill      : 2000      MRN: [id number]  Encounter Provider: JAVIER Floyd  Encounter Date: 2024   Encounter department: [facility]    Assessment & Plan   1. BMI 24.0-24.9, adult  Assessment & Plan:  - Down 4 pounds since last visit.  - Continue phentermine 37.5 mg daily. Discussed side effects.  - Encourage healthy diet and regular exercise.  - Recommend follow up in 1 month.   Orders:  -     phentermine (ADIPEX-P) 37.5 MG tablet; Take 1 tablet (37.5 mg total) by mouth in the morning  2. Other depression  Assessment & Plan:  - Well controlled without medication.   - Will continue to monitor.          History of Present Illness     Patient presents to office today to follow up. She is taking phentermine for weight loss. She is down 4 pounds since last visit. Denies any side effects from the medication. Denies any other significant concerns or complaints today.        Review of Systems   Constitutional:  Negative for fatigue and fever.   HENT:  Negative for trouble swallowing.    Eyes:  Negative for visual disturbance.   Respiratory:  Negative for cough and shortness of breath.    Cardiovascular:  Negative for chest pain and palpitations.   Gastrointestinal:  Negative for abdominal pain and blood in stool.   Endocrine: Negative for cold intolerance and heat intolerance.   Genitourinary:  Negative for difficulty urinating and dysuria.   Musculoskeletal:  Negative for gait problem.   Skin:  Negative for rash.   Neurological:  Negative for dizziness, syncope and headaches.   Hematological:  Negative for adenopathy.   Psychiatric/Behavioral:  Negative for behavioral problems.      Past Medical History:   Diagnosis Date    History of placement of ear tubes      Past Surgical History:   Procedure Laterality Date    TYMPANOSTOMY TUBE PLACEMENT       Family History   Problem Relation Age of Onset    Hypertension Maternal Grandmother      COPD Maternal Grandmother     Breast cancer Maternal Grandfather      Social History     Tobacco Use    Smoking status: Never     Passive exposure: Never    Smokeless tobacco: Never   Vaping Use    Vaping status: Never Used   Substance and Sexual Activity    Alcohol use: Yes    Drug use: No    Sexual activity: Yes     Partners: Male     Current Outpatient Medications on File Prior to Visit   Medication Sig    etonogestrel (NEXPLANON) subdermal implant Inject under the skin    metroNIDAZOLE (FLAGYL) 500 mg tablet Take 500 mg by mouth every 8 (eight) hours    norethindrone (AYGESTIN) 5 mg tablet Take 10 mg by mouth daily    [DISCONTINUED] phentermine (ADIPEX-P) 37.5 MG tablet Take 1 tablet (37.5 mg total) by mouth in the morning    clotrimazole-betamethasone (LOTRISONE) 1-0.05 % cream APPLY TO AFFECTED AREA TWICE A DAY (Patient not taking: Reported on 6/2/2024)    norethindrone (MICRONOR) 0.35 MG tablet Take 0.35 mg by mouth daily (Patient not taking: Reported on 8/21/2024)    pantoprazole (PROTONIX) 20 mg tablet Take 1 tablet (20 mg total) by mouth daily before breakfast (Patient not taking: Reported on 8/21/2024)    triamcinolone (KENALOG) 0.1 % ointment  (Patient not taking: Reported on 8/21/2024)    triamcinolone (KENALOG) 0.1 % ointment Apply 1 application. topically 2 (two) times a day    triamcinolone (KENALOG) 0.5 % cream Apply topically 3 (three) times a day (Patient not taking: Reported on 8/21/2024)     Allergies   Allergen Reactions    Sulfisoxazole      Immunization History   Administered Date(s) Administered    COVID-19 PFIZER VACCINE 0.3 ML IM 12/29/2020, 01/19/2021, 09/29/2021    COVID-19 Pfizer Vac BIVALENT Jonny-sucrose 12 Yr+ IM 10/07/2022    DTaP 2000, 2000, 2000, 05/16/2001, 02/23/2005    HPV 08/08/2013, 12/20/2013    HPV Quadrivalent 05/29/2013    Hep A, ped/adol, 2 dose 10/31/2008    Hep A, ped/adol, 3 dose 09/22/2007    Hep B, Adolescent or Pediatric 2000, 02/12/2001,  "05/23/2001    INFLUENZA 12/29/2014, 10/28/2015, 10/19/2018, 10/12/2020    IPV 2000, 2000, 2000, 02/23/2005    Influenza, injectable, quadrivalent, preservative free 0.5 mL 11/08/2019    MMR 02/12/2001, 02/17/2004    Meningococcal Conjugate (MCV4O) 05/24/2012    Meningococcal MCV4, Unspecified 04/25/2017    Meningococcal MCV4P 04/25/2017    Pneumococcal Conjugate 13-Valent 2000, 2000, 2000, 05/16/2001    Tdap 05/24/2012    Varicella 02/12/2001, 09/22/2007     Objective     /74 (BP Location: Left arm, Patient Position: Sitting, Cuff Size: Large)   Pulse (!) 118   Temp 98.6 °F (37 °C) (Tympanic)   Resp 16   Ht 5' 6\" (1.676 m)   Wt 70.2 kg (154 lb 12.8 oz)   SpO2 95%   BMI 24.99 kg/m²     Physical Exam  Vitals and nursing note reviewed.   Constitutional:       Appearance: Normal appearance. She is well-developed.   HENT:      Head: Normocephalic and atraumatic.      Right Ear: External ear normal.      Left Ear: External ear normal.   Eyes:      Conjunctiva/sclera: Conjunctivae normal.   Cardiovascular:      Rate and Rhythm: Normal rate and regular rhythm.      Heart sounds: Normal heart sounds.   Pulmonary:      Effort: Pulmonary effort is normal.      Breath sounds: Normal breath sounds.   Musculoskeletal:         General: Normal range of motion.      Cervical back: Normal range of motion.   Skin:     General: Skin is warm and dry.   Neurological:      Mental Status: She is alert and oriented to person, place, and time.   Psychiatric:         Mood and Affect: Mood normal.         Behavior: Behavior normal.         "

## 2024-09-18 ENCOUNTER — OFFICE VISIT (OUTPATIENT)
Dept: FAMILY MEDICINE CLINIC | Facility: CLINIC | Age: 24
End: 2024-09-18
Payer: COMMERCIAL

## 2024-09-18 VITALS
BODY MASS INDEX: 24.2 KG/M2 | SYSTOLIC BLOOD PRESSURE: 122 MMHG | RESPIRATION RATE: 16 BRPM | OXYGEN SATURATION: 99 % | DIASTOLIC BLOOD PRESSURE: 76 MMHG | HEIGHT: 66 IN | WEIGHT: 150.6 LBS | HEART RATE: 96 BPM | TEMPERATURE: 98 F

## 2024-09-18 DIAGNOSIS — F32.89 OTHER DEPRESSION: Primary | ICD-10-CM

## 2024-09-18 PROCEDURE — 99213 OFFICE O/P EST LOW 20 MIN: CPT | Performed by: NURSE PRACTITIONER

## 2024-09-18 RX ORDER — PHENTERMINE HYDROCHLORIDE 37.5 MG/1
37.5 TABLET ORAL DAILY
Qty: 30 TABLET | Refills: 0 | Status: SHIPPED | OUTPATIENT
Start: 2024-09-18

## 2024-09-18 NOTE — PROGRESS NOTES
Ambulatory Visit  Name: Deysi Merrill      : 2000      MRN: [id number]  Encounter Provider: JAVIER Floyd  Encounter Date: 2024   Encounter department: [facility]    Assessment & Plan  BMI 24.0-24.9, adult  - Down 4 pounds since last visit. Down a total of 23 pounds since April.   - Continue phentermine 37.5 mg every other day for 1 more month.  Discussed side effects.  - Encourage healthy diet and regular exercise.  - Recommend follow up in 1 month.     Orders:    phentermine (ADIPEX-P) 37.5 MG tablet; Take 1 tablet (37.5 mg total) by mouth in the morning         History of Present Illness     Patient presents to office today for 1 month follow up. She is on phentermine for weight loss. She is down 4 pounds since last visit. She is down 23 pounds since April. Currently at her goal weight. She denies any significant side effects. She denies any other concerns or complaints today.         Review of Systems   Constitutional:  Negative for fatigue and fever.   HENT:  Negative for trouble swallowing.    Eyes:  Negative for visual disturbance.   Respiratory:  Negative for cough and shortness of breath.    Cardiovascular:  Negative for chest pain and palpitations.   Gastrointestinal:  Negative for abdominal pain and blood in stool.   Endocrine: Negative for cold intolerance and heat intolerance.   Genitourinary:  Negative for difficulty urinating and dysuria.   Musculoskeletal:  Negative for gait problem.   Skin:  Negative for rash.   Neurological:  Negative for dizziness, syncope and headaches.   Hematological:  Negative for adenopathy.   Psychiatric/Behavioral:  Negative for behavioral problems.      Past Medical History:   Diagnosis Date    History of placement of ear tubes      Past Surgical History:   Procedure Laterality Date    TYMPANOSTOMY TUBE PLACEMENT       Family History   Problem Relation Age of Onset    Hypertension Maternal Grandmother     COPD  Maternal Grandmother     Breast cancer Maternal Grandfather      Social History     Tobacco Use    Smoking status: Never     Passive exposure: Never    Smokeless tobacco: Never   Vaping Use    Vaping status: Never Used   Substance and Sexual Activity    Alcohol use: Yes    Drug use: No    Sexual activity: Yes     Partners: Male     Current Outpatient Medications on File Prior to Visit   Medication Sig    etonogestrel (NEXPLANON) subdermal implant Inject under the skin    [DISCONTINUED] phentermine (ADIPEX-P) 37.5 MG tablet Take 1 tablet (37.5 mg total) by mouth in the morning    clotrimazole-betamethasone (LOTRISONE) 1-0.05 % cream APPLY TO AFFECTED AREA TWICE A DAY (Patient not taking: Reported on 6/2/2024)    metroNIDAZOLE (FLAGYL) 500 mg tablet Take 500 mg by mouth every 8 (eight) hours (Patient not taking: Reported on 9/18/2024)    norethindrone (AYGESTIN) 5 mg tablet Take 10 mg by mouth daily (Patient not taking: Reported on 9/18/2024)    norethindrone (MICRONOR) 0.35 MG tablet Take 0.35 mg by mouth daily (Patient not taking: Reported on 8/21/2024)    pantoprazole (PROTONIX) 20 mg tablet Take 1 tablet (20 mg total) by mouth daily before breakfast (Patient not taking: Reported on 8/21/2024)    triamcinolone (KENALOG) 0.1 % ointment  (Patient not taking: Reported on 8/21/2024)    triamcinolone (KENALOG) 0.1 % ointment Apply 1 application. topically 2 (two) times a day (Patient not taking: Reported on 9/18/2024)    triamcinolone (KENALOG) 0.5 % cream Apply topically 3 (three) times a day (Patient not taking: Reported on 8/21/2024)     Allergies   Allergen Reactions    Sulfisoxazole      Immunization History   Administered Date(s) Administered    COVID-19 PFIZER VACCINE 0.3 ML IM 12/29/2020, 01/19/2021, 09/29/2021    COVID-19 Pfizer Vac BIVALENT Jonny-sucrose 12 Yr+ IM 10/07/2022    DTaP 2000, 2000, 2000, 05/16/2001, 02/23/2005    HPV 08/08/2013, 12/20/2013    HPV Quadrivalent 05/29/2013    Hep A,  "ped/adol, 2 dose 10/31/2008    Hep A, ped/adol, 3 dose 09/22/2007    Hep B, Adolescent or Pediatric 2000, 02/12/2001, 05/23/2001    INFLUENZA 12/29/2014, 10/28/2015, 10/19/2018, 10/12/2020    IPV 2000, 2000, 2000, 02/23/2005    Influenza, injectable, quadrivalent, preservative free 0.5 mL 11/08/2019    MMR 02/12/2001, 02/17/2004    Meningococcal Conjugate (MCV4O) 05/24/2012    Meningococcal MCV4, Unspecified 04/25/2017    Meningococcal MCV4P 04/25/2017    Pneumococcal Conjugate 13-Valent 2000, 2000, 2000, 05/16/2001    Tdap 05/24/2012    Varicella 02/12/2001, 09/22/2007     Objective     /76 (BP Location: Left arm, Patient Position: Sitting, Cuff Size: Large)   Pulse 96   Temp 98 °F (36.7 °C) (Tympanic)   Resp 16   Ht 5' 6\" (1.676 m)   Wt 68.3 kg (150 lb 9.6 oz)   SpO2 99%   BMI 24.31 kg/m²     Physical Exam  Vitals and nursing note reviewed.   Constitutional:       Appearance: Normal appearance. She is well-developed.   HENT:      Head: Normocephalic and atraumatic.      Right Ear: External ear normal.      Left Ear: External ear normal.   Eyes:      Conjunctiva/sclera: Conjunctivae normal.   Cardiovascular:      Rate and Rhythm: Normal rate and regular rhythm.      Heart sounds: Normal heart sounds.   Pulmonary:      Effort: Pulmonary effort is normal.      Breath sounds: Normal breath sounds.   Abdominal:      General: Bowel sounds are normal.   Musculoskeletal:         General: Normal range of motion.      Cervical back: Normal range of motion.   Skin:     General: Skin is warm and dry.   Neurological:      Mental Status: She is alert and oriented to person, place, and time.   Psychiatric:         Mood and Affect: Mood normal.         Behavior: Behavior normal.         "

## 2024-09-18 NOTE — ASSESSMENT & PLAN NOTE
- Down 4 pounds since last visit. Down a total of 23 pounds since April.   - Continue phentermine 37.5 mg every other day for 1 more month.  Discussed side effects.  - Encourage healthy diet and regular exercise.  - Recommend follow up in 1 month.     Orders:    phentermine (ADIPEX-P) 37.5 MG tablet; Take 1 tablet (37.5 mg total) by mouth in the morning

## 2024-10-28 ENCOUNTER — OFFICE VISIT (OUTPATIENT)
Dept: URGENT CARE | Age: 24
End: 2024-10-28
Payer: COMMERCIAL

## 2024-10-28 VITALS
OXYGEN SATURATION: 100 % | BODY MASS INDEX: 24.75 KG/M2 | DIASTOLIC BLOOD PRESSURE: 79 MMHG | HEIGHT: 66 IN | TEMPERATURE: 98.1 F | WEIGHT: 154 LBS | RESPIRATION RATE: 18 BRPM | SYSTOLIC BLOOD PRESSURE: 141 MMHG | HEART RATE: 99 BPM

## 2024-10-28 DIAGNOSIS — M54.6 ACUTE BILATERAL THORACIC BACK PAIN: Primary | ICD-10-CM

## 2024-10-28 PROCEDURE — G0382 LEV 3 HOSP TYPE B ED VISIT: HCPCS | Performed by: EMERGENCY MEDICINE

## 2024-10-28 RX ORDER — METHYLPREDNISOLONE 4 MG/1
TABLET ORAL
Qty: 21 TABLET | Refills: 0 | Status: SHIPPED | OUTPATIENT
Start: 2024-10-28
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
## 2024-12-09 ENCOUNTER — TELEPHONE (OUTPATIENT)
Age: 24
End: 2024-12-09

## 2024-12-11 ENCOUNTER — TELEPHONE (OUTPATIENT)
Dept: FAMILY MEDICINE CLINIC | Facility: CLINIC | Age: 24
End: 2024-12-11

## 2025-08-13 ENCOUNTER — OFFICE VISIT (OUTPATIENT)
Dept: FAMILY MEDICINE CLINIC | Facility: CLINIC | Age: 25
End: 2025-08-13
Payer: COMMERCIAL

## 2025-08-13 PROBLEM — Z00.00 HEALTHCARE MAINTENANCE: Status: ACTIVE | Noted: 2025-08-13

## 2025-08-13 PROBLEM — M54.2 NECK PAIN: Status: ACTIVE | Noted: 2025-08-13
